# Patient Record
Sex: FEMALE | Race: WHITE
[De-identification: names, ages, dates, MRNs, and addresses within clinical notes are randomized per-mention and may not be internally consistent; named-entity substitution may affect disease eponyms.]

---

## 2017-03-10 ENCOUNTER — HOSPITAL ENCOUNTER (OUTPATIENT)
Dept: HOSPITAL 19 - WSOH | Age: 57
End: 2017-03-10
Attending: INTERNAL MEDICINE

## 2017-03-10 DIAGNOSIS — Z02.1: Primary | ICD-10-CM

## 2018-01-24 ENCOUNTER — HOSPITAL ENCOUNTER (OUTPATIENT)
Dept: HOSPITAL 19 - MC.RAD | Age: 58
End: 2018-01-24
Payer: COMMERCIAL

## 2018-01-24 DIAGNOSIS — Z12.31: Primary | ICD-10-CM

## 2018-01-24 DIAGNOSIS — Z53.8: ICD-10-CM

## 2019-09-01 ENCOUNTER — HOSPITAL ENCOUNTER (EMERGENCY)
Dept: HOSPITAL 19 - COL.ER | Age: 59
Discharge: HOME | End: 2019-09-01
Payer: COMMERCIAL

## 2019-09-01 VITALS — SYSTOLIC BLOOD PRESSURE: 122 MMHG | TEMPERATURE: 97.8 F | DIASTOLIC BLOOD PRESSURE: 61 MMHG

## 2019-09-01 VITALS — BODY MASS INDEX: 25.16 KG/M2 | HEIGHT: 67.01 IN | WEIGHT: 160.28 LBS

## 2019-09-01 VITALS — HEART RATE: 72 BPM

## 2019-09-01 DIAGNOSIS — K63.89: ICD-10-CM

## 2019-09-01 DIAGNOSIS — R10.32: ICD-10-CM

## 2019-09-01 DIAGNOSIS — E03.9: ICD-10-CM

## 2019-09-01 DIAGNOSIS — Z90.710: ICD-10-CM

## 2019-09-01 DIAGNOSIS — R11.10: Primary | ICD-10-CM

## 2019-09-01 LAB
ALBUMIN SERPL-MCNC: 4.6 GM/DL (ref 3.5–5)
ALP SERPL-CCNC: 78 U/L (ref 50–136)
ALT SERPL-CCNC: 14 U/L (ref 9–52)
ANION GAP SERPL CALC-SCNC: 9 MMOL/L (ref 7–16)
AST SERPL-CCNC: 38 U/L (ref 15–37)
BASOPHILS # BLD: 0 10*3/UL (ref 0–0.2)
BASOPHILS NFR BLD AUTO: 0.6 % (ref 0–2)
BILIRUB SERPL-MCNC: 0.2 MG/DL (ref 0–1)
BUN SERPL-MCNC: 17 MG/DL (ref 7–17)
CALCIUM SERPL-MCNC: 9.6 MG/DL (ref 8.4–10.2)
CHLORIDE SERPL-SCNC: 108 MMOL/L (ref 98–107)
CO2 SERPL-SCNC: 25 MMOL/L (ref 22–30)
CREAT SERPL-SCNC: 0.83 UMOL/L (ref 0.52–1.25)
CRP SERPL-MCNC: 0.6 MG/DL (ref 0–0.9)
EOSINOPHIL # BLD: 0.2 10*3/UL (ref 0–0.7)
EOSINOPHIL NFR BLD: 2.9 % (ref 0–4)
ERYTHROCYTE [DISTWIDTH] IN BLOOD BY AUTOMATED COUNT: 13.4 % (ref 11.5–14.5)
GLUCOSE SERPL-MCNC: 83 MG/DL (ref 74–106)
GRANULOCYTES # BLD AUTO: 56.9 % (ref 42.2–75.2)
HCT VFR BLD AUTO: 45.6 % (ref 37–47)
HGB BLD-MCNC: 14.8 G/DL (ref 12.5–16)
LIPASE SERPL-CCNC: 152 U/L (ref 23–300)
LYMPHOCYTES # BLD: 2 10*3/UL (ref 1.2–3.4)
LYMPHOCYTES NFR BLD: 31.4 % (ref 20–51)
MCH RBC QN AUTO: 28 PG (ref 27–31)
MCHC RBC AUTO-ENTMCNC: 33 G/DL (ref 33–37)
MCV RBC AUTO: 88 FL (ref 80–100)
MONOCYTES # BLD: 0.5 10*3/UL (ref 0.1–0.6)
MONOCYTES NFR BLD AUTO: 8 % (ref 1.7–9.3)
NEUTROPHILS # BLD: 3.6 10*3/UL (ref 1.4–6.5)
PH UR STRIP.AUTO: 6 [PH] (ref 5–8)
PLATELET # BLD AUTO: 260 K/MM3 (ref 130–400)
PMV BLD AUTO: 10.6 FL (ref 7.4–10.4)
POTASSIUM SERPL-SCNC: 4.1 MMOL/L (ref 3.4–5)
PROT SERPL-MCNC: 8.5 GM/DL (ref 6.4–8.2)
RBC # BLD AUTO: 5.21 M/MM3 (ref 4.1–5.3)
RBC # UR: (no result) /HPF
SODIUM SERPL-SCNC: 142 MMOL/L (ref 137–145)
SP GR UR STRIP.AUTO: 1.02 (ref 1–1.03)
SQUAMOUS # URNS: (no result) /HPF
URINE LEUKOCYTE ESTERASE: (no result)
URN COLLECT METHOD CLASS: (no result)

## 2019-09-04 ENCOUNTER — HOSPITAL ENCOUNTER (OUTPATIENT)
Dept: HOSPITAL 19 - SDCO | Age: 59
Discharge: HOME | End: 2019-09-04
Attending: INTERNAL MEDICINE
Payer: COMMERCIAL

## 2019-09-04 VITALS — HEART RATE: 58 BPM | DIASTOLIC BLOOD PRESSURE: 63 MMHG | TEMPERATURE: 98.4 F | SYSTOLIC BLOOD PRESSURE: 114 MMHG

## 2019-09-04 VITALS — DIASTOLIC BLOOD PRESSURE: 47 MMHG | HEART RATE: 62 BPM | SYSTOLIC BLOOD PRESSURE: 107 MMHG

## 2019-09-04 VITALS — DIASTOLIC BLOOD PRESSURE: 63 MMHG | SYSTOLIC BLOOD PRESSURE: 114 MMHG | HEART RATE: 59 BPM

## 2019-09-04 VITALS — HEIGHT: 67.01 IN | WEIGHT: 164.91 LBS | BODY MASS INDEX: 25.88 KG/M2

## 2019-09-04 VITALS — HEART RATE: 56 BPM | SYSTOLIC BLOOD PRESSURE: 105 MMHG | DIASTOLIC BLOOD PRESSURE: 60 MMHG

## 2019-09-04 VITALS — SYSTOLIC BLOOD PRESSURE: 114 MMHG | HEART RATE: 59 BPM | DIASTOLIC BLOOD PRESSURE: 63 MMHG

## 2019-09-04 DIAGNOSIS — R63.3: ICD-10-CM

## 2019-09-04 DIAGNOSIS — C18.7: Primary | ICD-10-CM

## 2019-09-04 DIAGNOSIS — K56.699: ICD-10-CM

## 2019-09-04 DIAGNOSIS — D12.8: ICD-10-CM

## 2019-09-04 NOTE — NUR
Patient returns to bay 4 per cart and transfers from cart to recliner.  IV
fluids infusing and denies pain or nausea.  Sipping on water.  Spouse in room.

## 2019-09-04 NOTE — NUR
Dr. Acosta here and talking with the patient and spouse re: need for follow
up with surgeon, CT scan, and oncology consult.  All questions answered.

## 2019-09-04 NOTE — NUR
Dismissal instructions signed and patient taken to the front door per
wheelchair and assisted into vehicle with instructions in hand.

## 2019-10-10 ENCOUNTER — HOSPITAL ENCOUNTER (EMERGENCY)
Dept: HOSPITAL 19 - COL.ER | Age: 59
Discharge: HOME | End: 2019-10-10
Payer: COMMERCIAL

## 2019-10-10 VITALS — DIASTOLIC BLOOD PRESSURE: 62 MMHG | HEART RATE: 65 BPM | SYSTOLIC BLOOD PRESSURE: 106 MMHG

## 2019-10-10 VITALS — TEMPERATURE: 98.7 F

## 2019-10-10 VITALS — BODY MASS INDEX: 25.16 KG/M2 | WEIGHT: 160.28 LBS | HEIGHT: 67.01 IN

## 2019-10-10 DIAGNOSIS — Z85.038: ICD-10-CM

## 2019-10-10 DIAGNOSIS — J18.9: Primary | ICD-10-CM

## 2019-10-10 LAB
ALBUMIN SERPL-MCNC: 4.7 GM/DL (ref 3.5–5)
ALP SERPL-CCNC: 66 U/L (ref 50–136)
ALT SERPL-CCNC: 19 U/L (ref 9–52)
ANION GAP SERPL CALC-SCNC: 10 MMOL/L (ref 7–16)
AST SERPL-CCNC: 44 U/L (ref 15–37)
BASOPHILS # BLD: 0 10*3/UL (ref 0–0.2)
BASOPHILS NFR BLD AUTO: 0.6 % (ref 0–2)
BILIRUB SERPL-MCNC: 0.4 MG/DL (ref 0–1)
BUN SERPL-MCNC: 19 MG/DL (ref 7–17)
CALCIUM SERPL-MCNC: 9.4 MG/DL (ref 8.4–10.2)
CHLORIDE SERPL-SCNC: 107 MMOL/L (ref 98–107)
CO2 SERPL-SCNC: 24 MMOL/L (ref 22–30)
CREAT SERPL-SCNC: 0.68 UMOL/L (ref 0.52–1.25)
EOSINOPHIL # BLD: 0.8 10*3/UL (ref 0–0.7)
EOSINOPHIL NFR BLD: 11.3 % (ref 0–4)
ERYTHROCYTE [DISTWIDTH] IN BLOOD BY AUTOMATED COUNT: 14.1 % (ref 11.5–14.5)
GLUCOSE SERPL-MCNC: 76 MG/DL (ref 74–106)
GRANULOCYTES # BLD AUTO: 56 % (ref 42.2–75.2)
HCT VFR BLD AUTO: 44.1 % (ref 37–47)
HGB BLD-MCNC: 14.2 G/DL (ref 12.5–16)
LYMPHOCYTES # BLD: 1.7 10*3/UL (ref 1.2–3.4)
LYMPHOCYTES NFR BLD: 24 % (ref 20–51)
MCH RBC QN AUTO: 29 PG (ref 27–31)
MCHC RBC AUTO-ENTMCNC: 32 G/DL (ref 33–37)
MCV RBC AUTO: 89 FL (ref 80–100)
MONOCYTES # BLD: 0.6 10*3/UL (ref 0.1–0.6)
MONOCYTES NFR BLD AUTO: 7.8 % (ref 1.7–9.3)
NEUTROPHILS # BLD: 4 10*3/UL (ref 1.4–6.5)
PLATELET # BLD AUTO: 177 K/MM3 (ref 130–400)
PMV BLD AUTO: 11.1 FL (ref 7.4–10.4)
POTASSIUM SERPL-SCNC: 4.2 MMOL/L (ref 3.4–5)
PROT SERPL-MCNC: 8.5 GM/DL (ref 6.4–8.2)
RBC # BLD AUTO: 4.95 M/MM3 (ref 4.1–5.3)
SODIUM SERPL-SCNC: 141 MMOL/L (ref 137–145)

## 2020-07-16 ENCOUNTER — HOSPITAL ENCOUNTER (OUTPATIENT)
Dept: HOSPITAL 19 - SDCO | Age: 60
Discharge: HOME | End: 2020-07-16
Attending: UROLOGY
Payer: COMMERCIAL

## 2020-07-16 VITALS — TEMPERATURE: 97.8 F | SYSTOLIC BLOOD PRESSURE: 114 MMHG | HEART RATE: 64 BPM | DIASTOLIC BLOOD PRESSURE: 64 MMHG

## 2020-07-16 VITALS — SYSTOLIC BLOOD PRESSURE: 121 MMHG | DIASTOLIC BLOOD PRESSURE: 75 MMHG | HEART RATE: 56 BPM

## 2020-07-16 VITALS — DIASTOLIC BLOOD PRESSURE: 80 MMHG | TEMPERATURE: 98.9 F | HEART RATE: 54 BPM | SYSTOLIC BLOOD PRESSURE: 132 MMHG

## 2020-07-16 VITALS — HEART RATE: 60 BPM | DIASTOLIC BLOOD PRESSURE: 78 MMHG | SYSTOLIC BLOOD PRESSURE: 120 MMHG

## 2020-07-16 VITALS — DIASTOLIC BLOOD PRESSURE: 78 MMHG | HEART RATE: 60 BPM | SYSTOLIC BLOOD PRESSURE: 135 MMHG

## 2020-07-16 VITALS — BODY MASS INDEX: 25.96 KG/M2 | WEIGHT: 171.3 LBS | HEIGHT: 68 IN

## 2020-07-16 DIAGNOSIS — Z92.21: ICD-10-CM

## 2020-07-16 DIAGNOSIS — Z90.49: ICD-10-CM

## 2020-07-16 DIAGNOSIS — E03.9: ICD-10-CM

## 2020-07-16 DIAGNOSIS — N13.1: Primary | ICD-10-CM

## 2020-07-16 DIAGNOSIS — Z79.899: ICD-10-CM

## 2020-07-16 DIAGNOSIS — Z88.6: ICD-10-CM

## 2020-07-16 DIAGNOSIS — Z85.038: ICD-10-CM

## 2020-07-16 DIAGNOSIS — Z90.710: ICD-10-CM

## 2020-07-16 PROCEDURE — C2617 STENT, NON-COR, TEM W/O DEL: HCPCS

## 2020-07-16 PROCEDURE — C1769 GUIDE WIRE: HCPCS

## 2020-07-16 NOTE — NUR
Patient returns to room 4 per cart from PACU accompanied by Carlie MURILLO and is
awake and alert.  Temp 97.5.  Denies pain or nausea.  IV fluids infusing and
site is free of redness.  Patient is sipping on water and grape juice.

## 2020-07-16 NOTE — NUR
Dismissal instructions given and IV discontinued.  Provided office numbers for
questions and concerns.  Patient dismissed to home driven by spouse and taken
to the front door per wheelchair and assisted into vehicle by this RN with in
structions in hand.

## 2020-09-15 ENCOUNTER — HOSPITAL ENCOUNTER (OUTPATIENT)
Dept: HOSPITAL 19 - MC.RAD | Age: 60
End: 2020-09-15
Attending: NURSE PRACTITIONER
Payer: COMMERCIAL

## 2020-09-15 DIAGNOSIS — Z12.31: Primary | ICD-10-CM

## 2020-09-16 ENCOUNTER — HOSPITAL ENCOUNTER (EMERGENCY)
Dept: HOSPITAL 19 - COL.ER | Age: 60
Discharge: HOME | End: 2020-09-16
Payer: COMMERCIAL

## 2020-09-16 VITALS — SYSTOLIC BLOOD PRESSURE: 114 MMHG | HEART RATE: 60 BPM | DIASTOLIC BLOOD PRESSURE: 71 MMHG

## 2020-09-16 VITALS — TEMPERATURE: 97.7 F

## 2020-09-16 VITALS — HEIGHT: 67.99 IN | BODY MASS INDEX: 25.53 KG/M2 | WEIGHT: 168.43 LBS

## 2020-09-16 DIAGNOSIS — C18.9: ICD-10-CM

## 2020-09-16 DIAGNOSIS — R10.31: Primary | ICD-10-CM

## 2020-09-16 DIAGNOSIS — R11.2: ICD-10-CM

## 2020-09-16 LAB
ALBUMIN SERPL-MCNC: 5.2 GM/DL (ref 3.5–5)
ALP SERPL-CCNC: 83 U/L (ref 50–136)
ALT SERPL-CCNC: 49 U/L (ref 4–34)
ANION GAP SERPL CALC-SCNC: 13 MMOL/L (ref 7–16)
AST SERPL-CCNC: 53 U/L (ref 15–37)
BASOPHILS # BLD: 0.1 10*3/UL (ref 0–0.2)
BASOPHILS NFR BLD AUTO: 0.9 % (ref 0–2)
BILIRUB SERPL-MCNC: 0.5 MG/DL (ref 0–1)
BUN SERPL-MCNC: 15 MG/DL (ref 7–17)
CALCIUM SERPL-MCNC: 9.8 MG/DL (ref 8.4–10.2)
CHLORIDE SERPL-SCNC: 104 MMOL/L (ref 98–107)
CO2 SERPL-SCNC: 24 MMOL/L (ref 22–30)
CREAT SERPL-SCNC: 0.93 UMOL/L (ref 0.52–1.25)
CRP SERPL-MCNC: < 0.5 MG/DL (ref 0–0.9)
EOSINOPHIL # BLD: 0.2 10*3/UL (ref 0–0.7)
EOSINOPHIL NFR BLD: 2.7 % (ref 0–4)
ERYTHROCYTE [DISTWIDTH] IN BLOOD BY AUTOMATED COUNT: 18.2 % (ref 11.5–14.5)
GLUCOSE SERPL-MCNC: 114 MG/DL (ref 74–106)
GRANULOCYTES # BLD AUTO: 58.3 % (ref 42.2–75.2)
HCT VFR BLD AUTO: 45.2 % (ref 37–47)
HGB BLD-MCNC: 15.2 G/DL (ref 12.5–16)
LIPASE SERPL-CCNC: 220 U/L (ref 23–300)
LYMPHOCYTES # BLD: 1.7 10*3/UL (ref 1.2–3.4)
LYMPHOCYTES NFR BLD: 30.3 % (ref 20–51)
MCH RBC QN AUTO: 31 PG (ref 27–31)
MCHC RBC AUTO-ENTMCNC: 34 G/DL (ref 33–37)
MCV RBC AUTO: 93 FL (ref 80–100)
MONOCYTES # BLD: 0.4 10*3/UL (ref 0.1–0.6)
MONOCYTES NFR BLD AUTO: 7.6 % (ref 1.7–9.3)
NEUTROPHILS # BLD: 3.3 10*3/UL (ref 1.4–6.5)
PH UR STRIP.AUTO: 5 [PH] (ref 5–8)
PLATELET # BLD AUTO: 184 K/MM3 (ref 130–400)
PMV BLD AUTO: 10.5 FL (ref 7.4–10.4)
POTASSIUM SERPL-SCNC: 3.6 MMOL/L (ref 3.4–5)
PROT SERPL-MCNC: 9.3 GM/DL (ref 6.4–8.2)
RBC # BLD AUTO: 4.87 M/MM3 (ref 4.1–5.3)
RBC # UR STRIP.AUTO: (no result) /UL
RBC # UR: >50 /HPF
SODIUM SERPL-SCNC: 141 MMOL/L (ref 137–145)
SP GR UR STRIP.AUTO: 1.01 (ref 1–1.03)
SQUAMOUS # URNS: (no result) /HPF
UA DIPSTICK PNL UR STRIP.AUTO: (no result)
URN COLLECT METHOD CLASS: (no result)

## 2020-09-18 ENCOUNTER — HOSPITAL ENCOUNTER (EMERGENCY)
Dept: HOSPITAL 19 - COL.ER | Age: 60
Discharge: HOME | End: 2020-09-18
Payer: COMMERCIAL

## 2020-09-18 VITALS — SYSTOLIC BLOOD PRESSURE: 120 MMHG | HEART RATE: 64 BPM | TEMPERATURE: 98 F | DIASTOLIC BLOOD PRESSURE: 68 MMHG

## 2020-09-18 VITALS — HEIGHT: 67.99 IN | BODY MASS INDEX: 25.53 KG/M2 | WEIGHT: 168.43 LBS

## 2020-09-18 DIAGNOSIS — C18.9: ICD-10-CM

## 2020-09-18 DIAGNOSIS — R10.31: Primary | ICD-10-CM

## 2020-09-18 DIAGNOSIS — Z96.0: ICD-10-CM

## 2020-09-18 DIAGNOSIS — Z90.711: ICD-10-CM

## 2020-09-18 LAB
ALBUMIN SERPL-MCNC: 4.8 GM/DL (ref 3.5–5)
ALP SERPL-CCNC: 65 U/L (ref 50–136)
ALT SERPL-CCNC: 41 U/L (ref 4–34)
ANION GAP SERPL CALC-SCNC: 10 MMOL/L (ref 7–16)
AST SERPL-CCNC: 59 U/L (ref 15–37)
BASOPHILS # BLD: 0 10*3/UL (ref 0–0.2)
BASOPHILS NFR BLD AUTO: 0.9 % (ref 0–2)
BILIRUB SERPL-MCNC: 0.6 MG/DL (ref 0–1)
BUN SERPL-MCNC: 16 MG/DL (ref 7–17)
CALCIUM SERPL-MCNC: 9.5 MG/DL (ref 8.4–10.2)
CHLORIDE SERPL-SCNC: 104 MMOL/L (ref 98–107)
CO2 SERPL-SCNC: 25 MMOL/L (ref 22–30)
CREAT SERPL-SCNC: 0.87 UMOL/L (ref 0.52–1.25)
CRP SERPL-MCNC: < 0.5 MG/DL (ref 0–0.9)
EOSINOPHIL # BLD: 0.2 10*3/UL (ref 0–0.7)
EOSINOPHIL NFR BLD: 4.3 % (ref 0–4)
ERYTHROCYTE [DISTWIDTH] IN BLOOD BY AUTOMATED COUNT: 17.9 % (ref 11.5–14.5)
GLUCOSE SERPL-MCNC: 91 MG/DL (ref 74–106)
GRANULOCYTES # BLD AUTO: 40.7 % (ref 42.2–75.2)
HCT VFR BLD AUTO: 42.9 % (ref 37–47)
HGB BLD-MCNC: 14.3 G/DL (ref 12.5–16)
LIPASE SERPL-CCNC: 133 U/L (ref 23–300)
LYMPHOCYTES # BLD: 1.9 10*3/UL (ref 1.2–3.4)
LYMPHOCYTES NFR BLD: 42.6 % (ref 20–51)
MCH RBC QN AUTO: 31 PG (ref 27–31)
MCHC RBC AUTO-ENTMCNC: 33 G/DL (ref 33–37)
MCV RBC AUTO: 94 FL (ref 80–100)
MONOCYTES # BLD: 0.5 10*3/UL (ref 0.1–0.6)
MONOCYTES NFR BLD AUTO: 11.3 % (ref 1.7–9.3)
NEUTROPHILS # BLD: 1.8 10*3/UL (ref 1.4–6.5)
PH UR STRIP.AUTO: 5 [PH] (ref 5–8)
PLATELET # BLD AUTO: 181 K/MM3 (ref 130–400)
PMV BLD AUTO: 10.7 FL (ref 7.4–10.4)
POTASSIUM SERPL-SCNC: 4.2 MMOL/L (ref 3.4–5)
PROT SERPL-MCNC: 8.7 GM/DL (ref 6.4–8.2)
RBC # BLD AUTO: 4.56 M/MM3 (ref 4.1–5.3)
RBC # UR STRIP.AUTO: (no result) /UL
RBC # UR: (no result) /HPF
SODIUM SERPL-SCNC: 138 MMOL/L (ref 137–145)
SP GR UR STRIP.AUTO: 1.01 (ref 1–1.03)
SQUAMOUS # URNS: (no result) /HPF
URN COLLECT METHOD CLASS: (no result)

## 2020-09-19 ENCOUNTER — HOSPITAL ENCOUNTER (OUTPATIENT)
Dept: HOSPITAL 19 - COL.ER | Age: 60
Setting detail: OBSERVATION
LOS: 1 days | Discharge: HOME | End: 2020-09-20
Attending: SURGERY | Admitting: SURGERY
Payer: COMMERCIAL

## 2020-09-19 VITALS — DIASTOLIC BLOOD PRESSURE: 72 MMHG | HEART RATE: 55 BPM | SYSTOLIC BLOOD PRESSURE: 130 MMHG

## 2020-09-19 VITALS — HEART RATE: 62 BPM | SYSTOLIC BLOOD PRESSURE: 130 MMHG | DIASTOLIC BLOOD PRESSURE: 75 MMHG

## 2020-09-19 VITALS — DIASTOLIC BLOOD PRESSURE: 66 MMHG | TEMPERATURE: 97.5 F | HEART RATE: 65 BPM | SYSTOLIC BLOOD PRESSURE: 124 MMHG

## 2020-09-19 VITALS — SYSTOLIC BLOOD PRESSURE: 132 MMHG | HEART RATE: 65 BPM | DIASTOLIC BLOOD PRESSURE: 70 MMHG

## 2020-09-19 VITALS — DIASTOLIC BLOOD PRESSURE: 62 MMHG | HEART RATE: 97 BPM | SYSTOLIC BLOOD PRESSURE: 122 MMHG

## 2020-09-19 VITALS — SYSTOLIC BLOOD PRESSURE: 137 MMHG | DIASTOLIC BLOOD PRESSURE: 69 MMHG | HEART RATE: 62 BPM

## 2020-09-19 VITALS — SYSTOLIC BLOOD PRESSURE: 113 MMHG | HEART RATE: 66 BPM | TEMPERATURE: 97.6 F | DIASTOLIC BLOOD PRESSURE: 67 MMHG

## 2020-09-19 VITALS — DIASTOLIC BLOOD PRESSURE: 72 MMHG | HEART RATE: 62 BPM | SYSTOLIC BLOOD PRESSURE: 131 MMHG

## 2020-09-19 VITALS — WEIGHT: 171.08 LBS | HEIGHT: 67.99 IN | BODY MASS INDEX: 25.93 KG/M2

## 2020-09-19 VITALS — TEMPERATURE: 97.6 F | SYSTOLIC BLOOD PRESSURE: 113 MMHG | DIASTOLIC BLOOD PRESSURE: 67 MMHG | HEART RATE: 65 BPM

## 2020-09-19 DIAGNOSIS — C78.6: ICD-10-CM

## 2020-09-19 DIAGNOSIS — Z90.710: ICD-10-CM

## 2020-09-19 DIAGNOSIS — C18.9: Primary | ICD-10-CM

## 2020-09-19 DIAGNOSIS — Z90.49: ICD-10-CM

## 2020-09-19 LAB
ALBUMIN SERPL-MCNC: 5.1 GM/DL (ref 3.5–5)
ALP SERPL-CCNC: 87 U/L (ref 50–136)
ALT SERPL-CCNC: 44 U/L (ref 4–34)
ANION GAP SERPL CALC-SCNC: 12 MMOL/L (ref 7–16)
AST SERPL-CCNC: 56 U/L (ref 15–37)
BASOPHILS # BLD: 0.1 10*3/UL (ref 0–0.2)
BASOPHILS NFR BLD AUTO: 0.8 % (ref 0–2)
BILIRUB SERPL-MCNC: 0.6 MG/DL (ref 0–1)
BUN SERPL-MCNC: 13 MG/DL (ref 7–17)
CALCIUM SERPL-MCNC: 9.6 MG/DL (ref 8.4–10.2)
CHLORIDE SERPL-SCNC: 106 MMOL/L (ref 98–107)
CO2 SERPL-SCNC: 22 MMOL/L (ref 22–30)
CREAT SERPL-SCNC: 0.87 UMOL/L (ref 0.52–1.25)
CRP SERPL-MCNC: < 0.5 MG/DL (ref 0–0.9)
EOSINOPHIL # BLD: 0.2 10*3/UL (ref 0–0.7)
EOSINOPHIL NFR BLD: 3.7 % (ref 0–4)
ERYTHROCYTE [DISTWIDTH] IN BLOOD BY AUTOMATED COUNT: 18 % (ref 11.5–14.5)
GLUCOSE SERPL-MCNC: 98 MG/DL (ref 74–106)
GRANULOCYTES # BLD AUTO: 46.6 % (ref 42.2–75.2)
HCT VFR BLD AUTO: 47.4 % (ref 37–47)
HGB BLD-MCNC: 16 G/DL (ref 12.5–16)
LIPASE SERPL-CCNC: 148 U/L (ref 23–300)
LYMPHOCYTES # BLD: 2.5 10*3/UL (ref 1.2–3.4)
LYMPHOCYTES NFR BLD: 39.8 % (ref 20–51)
MCH RBC QN AUTO: 32 PG (ref 27–31)
MCHC RBC AUTO-ENTMCNC: 34 G/DL (ref 33–37)
MCV RBC AUTO: 94 FL (ref 80–100)
MONOCYTES # BLD: 0.6 10*3/UL (ref 0.1–0.6)
MONOCYTES NFR BLD AUTO: 8.9 % (ref 1.7–9.3)
NEUTROPHILS # BLD: 2.9 10*3/UL (ref 1.4–6.5)
PLATELET # BLD AUTO: 204 K/MM3 (ref 130–400)
PMV BLD AUTO: 10.3 FL (ref 7.4–10.4)
POTASSIUM SERPL-SCNC: 3.9 MMOL/L (ref 3.4–5)
PROT SERPL-MCNC: 9.3 GM/DL (ref 6.4–8.2)
RBC # BLD AUTO: 5.07 M/MM3 (ref 4.1–5.3)
SODIUM SERPL-SCNC: 139 MMOL/L (ref 137–145)

## 2020-09-19 PROCEDURE — G0378 HOSPITAL OBSERVATION PER HR: HCPCS

## 2020-09-19 PROCEDURE — A4314 CATH W/DRAINAGE 2-WAY LATEX: HCPCS

## 2020-09-19 NOTE — NUR
Admission assessment complete. A&Ox3. VS stable. Denies nausea/shortness of
breath/pain. IV to left forearm with LR@100mls/hr. Lap sites x2 abdomen-edges
well approximated-no drainage noted. +Flatus. Plan of care discussed for this
shift to include pain control/IV fluids for hydration and calling for needs.
Verbalizes understanding. Call light in reach. Will monitor.

## 2020-09-19 NOTE — NUR
Patient has done well since up from surgery. Denies pain, states she feels
bloated after meal this afternoon. Up ambulating in halls with steady gait.
Denies further needs at this time. Will report off to night shift.

## 2020-09-20 VITALS — HEART RATE: 69 BPM | SYSTOLIC BLOOD PRESSURE: 112 MMHG | TEMPERATURE: 98.8 F | DIASTOLIC BLOOD PRESSURE: 77 MMHG

## 2020-09-20 VITALS — SYSTOLIC BLOOD PRESSURE: 121 MMHG | HEART RATE: 72 BPM | TEMPERATURE: 97.8 F | DIASTOLIC BLOOD PRESSURE: 63 MMHG

## 2020-09-20 VITALS — DIASTOLIC BLOOD PRESSURE: 65 MMHG | HEART RATE: 62 BPM | SYSTOLIC BLOOD PRESSURE: 121 MMHG | TEMPERATURE: 98.5 F

## 2020-09-20 LAB
PH UR STRIP.AUTO: 6 [PH] (ref 5–8)
RBC # UR STRIP.AUTO: (no result) /UL
RBC # UR: (no result) /HPF
SP GR UR STRIP.AUTO: 1.01 (ref 1–1.03)
URN COLLECT METHOD CLASS: (no result)

## 2020-09-20 NOTE — NUR
Patient alert and oriented, answers questions appropriately.  See assessment.
Abdomen soft, non tender, non distended.  Bowel sounds active x4 quads.
+Flatus. Lap sites with edges well approximated, no redness or drainage noted.
 No c/o at this time.

## 2020-09-20 NOTE — NUR
Discharge instructions reviewed with patient, verbalized understanding.
Discharged via wheelchair to auto/home with family at 0908.

## 2020-10-16 ENCOUNTER — HOSPITAL ENCOUNTER (OUTPATIENT)
Dept: HOSPITAL 19 - COL.RAD | Age: 60
End: 2020-10-16
Attending: INTERNAL MEDICINE
Payer: COMMERCIAL

## 2020-10-16 DIAGNOSIS — C18.7: Primary | ICD-10-CM

## 2020-10-16 DIAGNOSIS — M51.16: ICD-10-CM

## 2020-10-16 PROCEDURE — A9585 GADOBUTROL INJECTION: HCPCS

## 2020-10-19 ENCOUNTER — HOSPITAL ENCOUNTER (EMERGENCY)
Dept: HOSPITAL 19 - COL.ER | Age: 60
Discharge: HOME | End: 2020-10-19
Attending: EMERGENCY MEDICINE
Payer: COMMERCIAL

## 2020-10-19 ENCOUNTER — HOSPITAL ENCOUNTER (EMERGENCY)
Dept: HOSPITAL 6 - ED | Age: 60
Discharge: HOME | End: 2020-10-19
Payer: COMMERCIAL

## 2020-10-19 VITALS — BODY MASS INDEX: 25.06 KG/M2 | WEIGHT: 165.35 LBS | HEIGHT: 67.99 IN

## 2020-10-19 VITALS
DIASTOLIC BLOOD PRESSURE: 75 MMHG | DIASTOLIC BLOOD PRESSURE: 75 MMHG | SYSTOLIC BLOOD PRESSURE: 142 MMHG | SYSTOLIC BLOOD PRESSURE: 142 MMHG

## 2020-10-19 VITALS — WEIGHT: 165.35 LBS | HEIGHT: 67.99 IN | BODY MASS INDEX: 25.06 KG/M2

## 2020-10-19 VITALS — TEMPERATURE: 99.3 F

## 2020-10-19 VITALS — DIASTOLIC BLOOD PRESSURE: 80 MMHG | SYSTOLIC BLOOD PRESSURE: 146 MMHG | HEART RATE: 78 BPM

## 2020-10-19 DIAGNOSIS — C18.9: ICD-10-CM

## 2020-10-19 DIAGNOSIS — Z88.6: ICD-10-CM

## 2020-10-19 DIAGNOSIS — R10.9: ICD-10-CM

## 2020-10-19 DIAGNOSIS — R11.2: Primary | ICD-10-CM

## 2020-10-19 DIAGNOSIS — R11.10: Primary | ICD-10-CM

## 2020-10-19 LAB
ALBUMIN SERPL-MCNC: 5.1 GM/DL (ref 3.5–5)
ALP SERPL-CCNC: 76 U/L (ref 50–136)
ALT SERPL-CCNC: 30 U/L (ref 4–34)
ANION GAP SERPL CALC-SCNC: 11 MMOL/L (ref 7–16)
AST SERPL-CCNC: 38 U/L (ref 15–37)
BASOPHILS # BLD: 0 10*3/UL (ref 0–0.2)
BASOPHILS NFR BLD AUTO: 0.3 % (ref 0–2)
BILIRUB SERPL-MCNC: 0.6 MG/DL (ref 0–1)
BUN SERPL-MCNC: 10 MG/DL (ref 7–17)
CALCIUM SERPL-MCNC: 9.5 MG/DL (ref 8.4–10.2)
CALCIUM SERPL-MCNC: 9.6 MG/DL (ref 8.3–10.5)
CHLORIDE SERPL-SCNC: 107 MMOL/L (ref 98–107)
CO2 SERPL-SCNC: 19 MMOL/L (ref 22–29)
CO2 SERPL-SCNC: 22 MMOL/L (ref 22–30)
CREAT SERPL-SCNC: 0.78 UMOL/L (ref 0.52–1.25)
EOSINOPHIL # BLD: 0 10*3/UL (ref 0–0.7)
EOSINOPHIL NFR BLD: 0.2 % (ref 0–4)
ERYTHROCYTE [DISTWIDTH] IN BLOOD BY AUTOMATED COUNT: 15 % (ref 11.5–14.5)
ERYTHROCYTE [DISTWIDTH] IN BLOOD BY AUTOMATED COUNT: 15.1 % (ref 11.5–14.5)
GLUCOSE SERPL-MCNC: 104 MG/DL (ref 74–106)
GLUCOSE SERPL-MCNC: 157 MG/DL (ref 65–105)
GRANULOCYTES # BLD AUTO: 76.8 % (ref 42.2–75.2)
HCT VFR BLD AUTO: 45.3 % (ref 37–47)
HCT VFR BLD AUTO: 46.4 % (ref 37–47)
HGB BLD-MCNC: 15.3 G/DL (ref 12.5–16)
HGB BLD-MCNC: 15.7 G/DL (ref 12.5–16)
KETONES UR STRIP.AUTO-MCNC: (no result) MG/DL
LIPASE SERPL-CCNC: 87 U/L (ref 23–300)
LYMPHOCYTES # BLD: 1.5 10*3/UL (ref 1.2–3.4)
LYMPHOCYTES NFR BLD MANUAL: 13 % (ref 20–51)
LYMPHOCYTES NFR BLD: 14.7 % (ref 20–51)
MCH RBC QN AUTO: 31 PG (ref 27–31)
MCH RBC QN AUTO: 31 PG (ref 27–31)
MCHC RBC AUTO-ENTMCNC: 34 G/DL (ref 33–37)
MCHC RBC AUTO-ENTMCNC: 34 G/DL (ref 33–37)
MCV RBC AUTO: 92 FL (ref 78–100)
MCV RBC AUTO: 92 FL (ref 80–100)
MONOCYTES # BLD: 0.8 10*3/UL (ref 0.1–0.6)
MONOCYTES NFR BLD AUTO: 7.8 % (ref 1.7–9.3)
MONOCYTES NFR BLD: 4 % (ref 3–10)
NEUTROPHILS # BLD: 8.1 10*3/UL (ref 1.4–6.5)
NEUTS SEG NFR BLD MANUAL: 83 % (ref 42–75)
PH UR STRIP.AUTO: 6 [PH] (ref 5–8)
PLATELET # BLD AUTO: 286 K/MM3 (ref 130–400)
PLATELET # BLD AUTO: 292 K/MM3 (ref 130–400)
PMV BLD AUTO: 10.2 FL (ref 7.4–10.4)
PMV BLD AUTO: 10.9 FL (ref 7.4–10.4)
POTASSIUM SERPL-SCNC: 3.7 MMOL/L (ref 3.5–5.1)
POTASSIUM SERPL-SCNC: 3.9 MMOL/L (ref 3.4–5)
PROT SERPL-MCNC: 9.2 GM/DL (ref 6.4–8.2)
RBC # BLD AUTO: 4.94 M/MM3 (ref 4.1–5.3)
RBC # BLD AUTO: 5.03 M/MM3 (ref 4.1–5.3)
RBC # UR: (no result) /HPF
SODIUM SERPL-SCNC: 140 MMOL/L (ref 136–145)
SODIUM SERPL-SCNC: 140 MMOL/L (ref 137–145)
SP GR UR STRIP.AUTO: 1.04 (ref 1–1.03)
SQUAMOUS # URNS: (no result) /HPF
URN COLLECT METHOD CLASS: (no result)
WBC # BLD AUTO: 9.2 K/MM3 (ref 4.8–10.8)

## 2020-10-19 PROCEDURE — C9113 INJ PANTOPRAZOLE SODIUM, VIA: HCPCS

## 2020-10-27 ENCOUNTER — HOSPITAL ENCOUNTER (EMERGENCY)
Dept: HOSPITAL 19 - COL.ER | Age: 60
Discharge: HOME | End: 2020-10-27
Attending: EMERGENCY MEDICINE
Payer: COMMERCIAL

## 2020-10-27 VITALS — WEIGHT: 160.28 LBS | BODY MASS INDEX: 24.29 KG/M2 | HEIGHT: 67.99 IN

## 2020-10-27 VITALS — TEMPERATURE: 98 F

## 2020-10-27 VITALS — DIASTOLIC BLOOD PRESSURE: 68 MMHG | HEART RATE: 79 BPM | SYSTOLIC BLOOD PRESSURE: 121 MMHG

## 2020-10-27 DIAGNOSIS — G62.9: ICD-10-CM

## 2020-10-27 DIAGNOSIS — E86.0: ICD-10-CM

## 2020-10-27 DIAGNOSIS — N39.0: ICD-10-CM

## 2020-10-27 DIAGNOSIS — Z90.710: ICD-10-CM

## 2020-10-27 DIAGNOSIS — Z96.0: ICD-10-CM

## 2020-10-27 DIAGNOSIS — Z88.5: ICD-10-CM

## 2020-10-27 DIAGNOSIS — E03.9: ICD-10-CM

## 2020-10-27 DIAGNOSIS — C78.5: Primary | ICD-10-CM

## 2020-10-27 LAB
ALBUMIN SERPL-MCNC: 5.1 GM/DL (ref 3.5–5)
ALP SERPL-CCNC: 66 U/L (ref 50–136)
ALT SERPL-CCNC: 32 U/L (ref 4–34)
ANION GAP SERPL CALC-SCNC: 12 MMOL/L (ref 7–16)
AST SERPL-CCNC: 38 U/L (ref 15–37)
BILIRUB SERPL-MCNC: 0.5 MG/DL (ref 0–1)
BUN SERPL-MCNC: 12 MG/DL (ref 7–17)
CALCIUM SERPL-MCNC: 10.2 MG/DL (ref 8.4–10.2)
CHLORIDE SERPL-SCNC: 107 MMOL/L (ref 98–107)
CO2 SERPL-SCNC: 22 MMOL/L (ref 22–30)
CREAT SERPL-SCNC: 0.69 UMOL/L (ref 0.52–1.25)
ERYTHROCYTE [DISTWIDTH] IN BLOOD BY AUTOMATED COUNT: 14.3 % (ref 11.5–14.5)
GLUCOSE SERPL-MCNC: 135 MG/DL (ref 74–106)
HCT VFR BLD AUTO: 45.2 % (ref 37–47)
HGB BLD-MCNC: 15.1 G/DL (ref 12.5–16)
LIPASE SERPL-CCNC: 67 U/L (ref 23–300)
LYMPHOCYTES NFR BLD MANUAL: 6 % (ref 20–51)
MCH RBC QN AUTO: 32 PG (ref 27–31)
MCHC RBC AUTO-ENTMCNC: 33 G/DL (ref 33–37)
MCV RBC AUTO: 95 FL (ref 80–100)
MONOCYTES NFR BLD: 1 % (ref 1.7–9.3)
NEUTS BAND NFR BLD: 2 % (ref 0–10)
NEUTS SEG NFR BLD MANUAL: 91 % (ref 42–75.2)
PH UR STRIP.AUTO: 5 [PH] (ref 5–8)
PLATELET # BLD AUTO: 225 K/MM3 (ref 130–400)
PLATELET BLD QL SMEAR: NORMAL
PMV BLD AUTO: 10.7 FL (ref 7.4–10.4)
POTASSIUM SERPL-SCNC: 3.8 MMOL/L (ref 3.4–5)
PROT SERPL-MCNC: 9.3 GM/DL (ref 6.4–8.2)
RBC # BLD AUTO: 4.78 M/MM3 (ref 4.1–5.3)
RBC # UR: (no result) /HPF
SODIUM SERPL-SCNC: 141 MMOL/L (ref 137–145)
SP GR UR STRIP.AUTO: 1.01 (ref 1–1.03)
SQUAMOUS # URNS: (no result) /HPF
URN COLLECT METHOD CLASS: (no result)

## 2020-11-17 ENCOUNTER — HOSPITAL ENCOUNTER (EMERGENCY)
Dept: HOSPITAL 19 - COL.ER | Age: 60
Discharge: HOME | End: 2020-11-17
Payer: COMMERCIAL

## 2020-11-17 VITALS — DIASTOLIC BLOOD PRESSURE: 95 MMHG | TEMPERATURE: 97.2 F | SYSTOLIC BLOOD PRESSURE: 170 MMHG | HEART RATE: 83 BPM

## 2020-11-17 VITALS — HEIGHT: 67.99 IN | WEIGHT: 165.35 LBS | BODY MASS INDEX: 25.06 KG/M2

## 2020-11-17 DIAGNOSIS — Z90.710: ICD-10-CM

## 2020-11-17 DIAGNOSIS — Z88.6: ICD-10-CM

## 2020-11-17 DIAGNOSIS — R10.9: Primary | ICD-10-CM

## 2020-11-17 LAB
ALBUMIN SERPL-MCNC: 4.9 GM/DL (ref 3.5–5)
ALP SERPL-CCNC: 81 U/L (ref 50–136)
ALT SERPL-CCNC: 27 U/L (ref 4–34)
ANION GAP SERPL CALC-SCNC: 15 MMOL/L (ref 7–16)
AST SERPL-CCNC: 35 U/L (ref 15–37)
BASOPHILS # BLD: 0 10*3/UL (ref 0–0.2)
BASOPHILS NFR BLD AUTO: 0.1 % (ref 0–2)
BILIRUB SERPL-MCNC: 0.5 MG/DL (ref 0–1)
BUN SERPL-MCNC: 11 MG/DL (ref 7–17)
CALCIUM SERPL-MCNC: 9.9 MG/DL (ref 8.4–10.2)
CHLORIDE SERPL-SCNC: 105 MMOL/L (ref 98–107)
CO2 SERPL-SCNC: 18 MMOL/L (ref 22–30)
CREAT SERPL-SCNC: 0.75 UMOL/L (ref 0.52–1.25)
CRP SERPL-MCNC: < 0.5 MG/DL (ref 0–0.9)
EOSINOPHIL # BLD: 0 10*3/UL (ref 0–0.7)
EOSINOPHIL NFR BLD: 0 % (ref 0–4)
ERYTHROCYTE [DISTWIDTH] IN BLOOD BY AUTOMATED COUNT: 13.6 % (ref 11.5–14.5)
GLUCOSE SERPL-MCNC: 148 MG/DL (ref 74–106)
GRANULOCYTES # BLD AUTO: 84.1 % (ref 42.2–75.2)
HCT VFR BLD AUTO: 43.3 % (ref 37–47)
HGB BLD-MCNC: 14.4 G/DL (ref 12.5–16)
LIPASE SERPL-CCNC: 82 U/L (ref 23–300)
LYMPHOCYTES # BLD: 1.1 10*3/UL (ref 1.2–3.4)
LYMPHOCYTES NFR BLD: 10.4 % (ref 20–51)
MCH RBC QN AUTO: 31 PG (ref 27–31)
MCHC RBC AUTO-ENTMCNC: 33 G/DL (ref 33–37)
MCV RBC AUTO: 92 FL (ref 80–100)
MONOCYTES # BLD: 0.6 10*3/UL (ref 0.1–0.6)
MONOCYTES NFR BLD AUTO: 5.1 % (ref 1.7–9.3)
NEUTROPHILS # BLD: 9 10*3/UL (ref 1.4–6.5)
PH UR STRIP.AUTO: 7 [PH] (ref 5–8)
PLATELET # BLD AUTO: 297 K/MM3 (ref 130–400)
PMV BLD AUTO: 10.4 FL (ref 7.4–10.4)
POTASSIUM SERPL-SCNC: 3.3 MMOL/L (ref 3.4–5)
PROT SERPL-MCNC: 8.7 GM/DL (ref 6.4–8.2)
RBC # BLD AUTO: 4.71 M/MM3 (ref 4.1–5.3)
RBC # UR: (no result) /HPF
SODIUM SERPL-SCNC: 138 MMOL/L (ref 137–145)
SP GR UR STRIP.AUTO: 1.01 (ref 1–1.03)
SQUAMOUS # URNS: (no result) /HPF
URN COLLECT METHOD CLASS: (no result)

## 2020-11-28 ENCOUNTER — HOSPITAL ENCOUNTER (EMERGENCY)
Dept: HOSPITAL 19 - COL.ER | Age: 60
Discharge: HOME | End: 2020-11-28
Attending: EMERGENCY MEDICINE
Payer: COMMERCIAL

## 2020-11-28 VITALS — SYSTOLIC BLOOD PRESSURE: 126 MMHG | TEMPERATURE: 97.8 F | DIASTOLIC BLOOD PRESSURE: 82 MMHG

## 2020-11-28 VITALS — BODY MASS INDEX: 24.29 KG/M2 | HEIGHT: 67.99 IN | WEIGHT: 160.28 LBS

## 2020-11-28 VITALS — HEART RATE: 74 BPM

## 2020-11-28 DIAGNOSIS — Z88.6: ICD-10-CM

## 2020-11-28 DIAGNOSIS — E03.9: ICD-10-CM

## 2020-11-28 DIAGNOSIS — R11.2: Primary | ICD-10-CM

## 2020-11-28 DIAGNOSIS — Z85.038: ICD-10-CM

## 2020-11-28 DIAGNOSIS — F32.9: ICD-10-CM

## 2020-11-28 DIAGNOSIS — Z90.710: ICD-10-CM

## 2020-11-28 DIAGNOSIS — Z79.890: ICD-10-CM

## 2020-11-28 DIAGNOSIS — R10.9: ICD-10-CM

## 2020-11-28 DIAGNOSIS — G89.29: ICD-10-CM

## 2020-11-28 LAB
ALBUMIN SERPL-MCNC: 4.8 GM/DL (ref 3.5–5)
ALP SERPL-CCNC: 95 U/L (ref 50–136)
ALT SERPL-CCNC: 27 U/L (ref 4–34)
ANION GAP SERPL CALC-SCNC: 14 MMOL/L (ref 7–16)
AST SERPL-CCNC: 35 U/L (ref 15–37)
BASOPHILS # BLD: 0 10*3/UL (ref 0–0.2)
BASOPHILS NFR BLD AUTO: 0.5 % (ref 0–2)
BILIRUB SERPL-MCNC: 0.4 MG/DL (ref 0–1)
BUN SERPL-MCNC: 10 MG/DL (ref 7–17)
CALCIUM SERPL-MCNC: 9.9 MG/DL (ref 8.4–10.2)
CHLORIDE SERPL-SCNC: 104 MMOL/L (ref 98–107)
CO2 SERPL-SCNC: 21 MMOL/L (ref 22–30)
CREAT SERPL-SCNC: 0.84 UMOL/L (ref 0.52–1.25)
CRP SERPL-MCNC: 0.7 MG/DL (ref 0–0.9)
EOSINOPHIL # BLD: 0.3 10*3/UL (ref 0–0.7)
EOSINOPHIL NFR BLD: 3.9 % (ref 0–4)
ERYTHROCYTE [DISTWIDTH] IN BLOOD BY AUTOMATED COUNT: 13.6 % (ref 11.5–14.5)
GLUCOSE SERPL-MCNC: 123 MG/DL (ref 74–106)
GRANULOCYTES # BLD AUTO: 62.6 % (ref 42.2–75.2)
HCT VFR BLD AUTO: 44.9 % (ref 37–47)
HGB BLD-MCNC: 14.9 G/DL (ref 12.5–16)
LYMPHOCYTES # BLD: 2.2 10*3/UL (ref 1.2–3.4)
LYMPHOCYTES NFR BLD: 25.3 % (ref 20–51)
MCH RBC QN AUTO: 31 PG (ref 27–31)
MCHC RBC AUTO-ENTMCNC: 33 G/DL (ref 33–37)
MCV RBC AUTO: 93 FL (ref 80–100)
MONOCYTES # BLD: 0.6 10*3/UL (ref 0.1–0.6)
MONOCYTES NFR BLD AUTO: 7.4 % (ref 1.7–9.3)
NEUTROPHILS # BLD: 5.4 10*3/UL (ref 1.4–6.5)
PH UR STRIP.AUTO: 5 [PH] (ref 5–8)
PLATELET # BLD AUTO: 349 K/MM3 (ref 130–400)
PMV BLD AUTO: 10.1 FL (ref 7.4–10.4)
POTASSIUM SERPL-SCNC: 3.4 MMOL/L (ref 3.4–5)
PROT SERPL-MCNC: 8.5 GM/DL (ref 6.4–8.2)
RBC # BLD AUTO: 4.81 M/MM3 (ref 4.1–5.3)
RBC # UR: (no result) /HPF
SODIUM SERPL-SCNC: 139 MMOL/L (ref 137–145)
SP GR UR STRIP.AUTO: 1.01 (ref 1–1.03)
SQUAMOUS # URNS: (no result) /HPF
UA DIPSTICK PNL UR STRIP.AUTO: (no result)
URN COLLECT METHOD CLASS: (no result)

## 2020-12-23 ENCOUNTER — HOSPITAL ENCOUNTER (EMERGENCY)
Dept: HOSPITAL 19 - COL.ER | Age: 60
Discharge: HOME | End: 2020-12-23
Attending: EMERGENCY MEDICINE
Payer: COMMERCIAL

## 2020-12-23 VITALS — WEIGHT: 160.28 LBS | BODY MASS INDEX: 24.29 KG/M2 | HEIGHT: 67.99 IN

## 2020-12-23 VITALS — DIASTOLIC BLOOD PRESSURE: 71 MMHG | SYSTOLIC BLOOD PRESSURE: 121 MMHG | HEART RATE: 84 BPM

## 2020-12-23 VITALS — TEMPERATURE: 98.3 F

## 2020-12-23 DIAGNOSIS — R00.0: ICD-10-CM

## 2020-12-23 DIAGNOSIS — R19.00: Primary | ICD-10-CM

## 2020-12-23 DIAGNOSIS — Z88.1: ICD-10-CM

## 2020-12-23 DIAGNOSIS — R11.10: ICD-10-CM

## 2020-12-23 DIAGNOSIS — Z90.710: ICD-10-CM

## 2020-12-23 DIAGNOSIS — Z88.5: ICD-10-CM

## 2020-12-23 DIAGNOSIS — R10.84: ICD-10-CM

## 2020-12-23 LAB
ALBUMIN SERPL-MCNC: 5.1 GM/DL (ref 3.5–5)
ALP SERPL-CCNC: 97 U/L (ref 50–136)
ALT SERPL-CCNC: 30 U/L (ref 4–34)
ANION GAP SERPL CALC-SCNC: 14 MMOL/L (ref 7–16)
AST SERPL-CCNC: 38 U/L (ref 15–37)
BASOPHILS # BLD: 0 10*3/UL (ref 0–0.2)
BASOPHILS NFR BLD AUTO: 0.6 % (ref 0–2)
BILIRUB SERPL-MCNC: 0.5 MG/DL (ref 0–1)
BUN SERPL-MCNC: 14 MG/DL (ref 7–17)
CALCIUM SERPL-MCNC: 10 MG/DL (ref 8.4–10.2)
CHLORIDE SERPL-SCNC: 104 MMOL/L (ref 98–107)
CO2 SERPL-SCNC: 23 MMOL/L (ref 22–30)
CREAT SERPL-SCNC: 0.84 UMOL/L (ref 0.52–1.25)
CRP SERPL-MCNC: 0.8 MG/DL (ref 0–0.9)
EOSINOPHIL # BLD: 0 10*3/UL (ref 0–0.7)
EOSINOPHIL NFR BLD: 0.2 % (ref 0–4)
ERYTHROCYTE [DISTWIDTH] IN BLOOD BY AUTOMATED COUNT: 13.6 % (ref 11.5–14.5)
GLUCOSE SERPL-MCNC: 147 MG/DL (ref 74–106)
GRANULOCYTES # BLD AUTO: 86.7 % (ref 42.2–75.2)
HCT VFR BLD AUTO: 45.4 % (ref 37–47)
HGB BLD-MCNC: 14.8 G/DL (ref 12.5–16)
LIPASE SERPL-CCNC: 171 U/L (ref 23–300)
LYMPHOCYTES # BLD: 0.6 10*3/UL (ref 1.2–3.4)
LYMPHOCYTES NFR BLD: 8.9 % (ref 20–51)
MCH RBC QN AUTO: 30 PG (ref 27–31)
MCHC RBC AUTO-ENTMCNC: 33 G/DL (ref 33–37)
MCV RBC AUTO: 91 FL (ref 80–100)
MONOCYTES # BLD: 0.2 10*3/UL (ref 0.1–0.6)
MONOCYTES NFR BLD AUTO: 3.3 % (ref 1.7–9.3)
NEUTROPHILS # BLD: 5.7 10*3/UL (ref 1.4–6.5)
PH UR STRIP.AUTO: 6 [PH] (ref 5–8)
PLATELET # BLD AUTO: 353 K/MM3 (ref 130–400)
PMV BLD AUTO: 9.8 FL (ref 7.4–10.4)
POTASSIUM SERPL-SCNC: 3.8 MMOL/L (ref 3.4–5)
PROT SERPL-MCNC: 9.2 GM/DL (ref 6.4–8.2)
RBC # BLD AUTO: 4.98 M/MM3 (ref 4.1–5.3)
RBC # UR STRIP.AUTO: (no result) /UL
RBC # UR: (no result) /HPF
SODIUM SERPL-SCNC: 140 MMOL/L (ref 137–145)
SP GR UR STRIP.AUTO: 1.04 (ref 1–1.03)
SQUAMOUS # URNS: (no result) /HPF
URN COLLECT METHOD CLASS: (no result)

## 2020-12-24 ENCOUNTER — HOSPITAL ENCOUNTER (INPATIENT)
Dept: HOSPITAL 19 - COL.ER | Age: 60
LOS: 7 days | Discharge: HOME HEALTH | DRG: 844 | End: 2020-12-31
Attending: INTERNAL MEDICINE | Admitting: INTERNAL MEDICINE
Payer: COMMERCIAL

## 2020-12-24 VITALS — TEMPERATURE: 98.7 F | HEART RATE: 66 BPM | SYSTOLIC BLOOD PRESSURE: 130 MMHG | DIASTOLIC BLOOD PRESSURE: 77 MMHG

## 2020-12-24 VITALS — SYSTOLIC BLOOD PRESSURE: 134 MMHG | HEART RATE: 66 BPM | TEMPERATURE: 98.7 F | DIASTOLIC BLOOD PRESSURE: 72 MMHG

## 2020-12-24 VITALS — HEIGHT: 67.99 IN | BODY MASS INDEX: 24.29 KG/M2 | WEIGHT: 160.28 LBS

## 2020-12-24 DIAGNOSIS — C79.89: Primary | ICD-10-CM

## 2020-12-24 DIAGNOSIS — M79.2: ICD-10-CM

## 2020-12-24 DIAGNOSIS — F32.9: ICD-10-CM

## 2020-12-24 DIAGNOSIS — R11.2: ICD-10-CM

## 2020-12-24 DIAGNOSIS — R19.09: ICD-10-CM

## 2020-12-24 DIAGNOSIS — E87.6: ICD-10-CM

## 2020-12-24 DIAGNOSIS — Z90.710: ICD-10-CM

## 2020-12-24 DIAGNOSIS — Z88.5: ICD-10-CM

## 2020-12-24 DIAGNOSIS — Z90.49: ICD-10-CM

## 2020-12-24 DIAGNOSIS — N13.30: ICD-10-CM

## 2020-12-24 DIAGNOSIS — R07.89: ICD-10-CM

## 2020-12-24 DIAGNOSIS — C18.9: ICD-10-CM

## 2020-12-24 DIAGNOSIS — Z88.1: ICD-10-CM

## 2020-12-24 DIAGNOSIS — Z79.891: ICD-10-CM

## 2020-12-24 DIAGNOSIS — E03.9: ICD-10-CM

## 2020-12-24 LAB
ALBUMIN SERPL-MCNC: 4.5 GM/DL (ref 3.5–5)
ALP SERPL-CCNC: 77 U/L (ref 50–136)
ALT SERPL-CCNC: 23 U/L (ref 4–34)
ANION GAP SERPL CALC-SCNC: 14 MMOL/L (ref 7–16)
APTT PPP: 33.4 SECONDS (ref 26–37)
AST SERPL-CCNC: 34 U/L (ref 15–37)
BASOPHILS # BLD: 0 10*3/UL (ref 0–0.2)
BASOPHILS NFR BLD AUTO: 0.4 % (ref 0–2)
BILIRUB SERPL-MCNC: 0.4 MG/DL (ref 0–1)
BUN SERPL-MCNC: 12 MG/DL (ref 7–17)
CALCIUM SERPL-MCNC: 9.3 MG/DL (ref 8.4–10.2)
CHLORIDE SERPL-SCNC: 105 MMOL/L (ref 98–107)
CO2 SERPL-SCNC: 23 MMOL/L (ref 22–30)
CREAT SERPL-SCNC: 0.87 UMOL/L (ref 0.52–1.25)
EOSINOPHIL # BLD: 0.1 10*3/UL (ref 0–0.7)
EOSINOPHIL NFR BLD: 1.4 % (ref 0–4)
ERYTHROCYTE [DISTWIDTH] IN BLOOD BY AUTOMATED COUNT: 13.8 % (ref 11.5–14.5)
GLUCOSE SERPL-MCNC: 124 MG/DL (ref 74–106)
GRANULOCYTES # BLD AUTO: 77.8 % (ref 42.2–75.2)
HCT VFR BLD AUTO: 42.7 % (ref 37–47)
HGB BLD-MCNC: 13.9 G/DL (ref 12.5–16)
INR BLD: 1.1 (ref 0.8–3)
LIPASE SERPL-CCNC: 127 U/L (ref 23–300)
LYMPHOCYTES # BLD: 1.3 10*3/UL (ref 1.2–3.4)
LYMPHOCYTES NFR BLD: 12.3 % (ref 20–51)
MCH RBC QN AUTO: 30 PG (ref 27–31)
MCHC RBC AUTO-ENTMCNC: 33 G/DL (ref 33–37)
MCV RBC AUTO: 91 FL (ref 80–100)
MONOCYTES # BLD: 0.8 10*3/UL (ref 0.1–0.6)
MONOCYTES NFR BLD AUTO: 7.9 % (ref 1.7–9.3)
NEUTROPHILS # BLD: 8.1 10*3/UL (ref 1.4–6.5)
PH UR STRIP.AUTO: 6 [PH] (ref 5–8)
PLATELET # BLD AUTO: 347 K/MM3 (ref 130–400)
PMV BLD AUTO: 9.9 FL (ref 7.4–10.4)
POTASSIUM SERPL-SCNC: 3.1 MMOL/L (ref 3.4–5)
PROT SERPL-MCNC: 8.4 GM/DL (ref 6.4–8.2)
PROTHROMBIN TIME: 12.8 SECONDS (ref 9.7–12.8)
RBC # BLD AUTO: 4.67 M/MM3 (ref 4.1–5.3)
RBC # UR STRIP.AUTO: (no result) /UL
RBC # UR: (no result) /HPF
SODIUM SERPL-SCNC: 142 MMOL/L (ref 137–145)
SP GR UR STRIP.AUTO: 1.03 (ref 1–1.03)
SQUAMOUS # URNS: (no result) /HPF
TROPONIN I SERPL-MCNC: < 0.012 NG/ML (ref 0–0.04)
URN COLLECT METHOD CLASS: (no result)
WBC # UR: (no result) /HPF

## 2020-12-24 PROCEDURE — C9113 INJ PANTOPRAZOLE SODIUM, VIA: HCPCS

## 2020-12-24 PROCEDURE — C1751 CATH, INF, PER/CENT/MIDLINE: HCPCS

## 2020-12-24 PROCEDURE — G0378 HOSPITAL OBSERVATION PER HR: HCPCS

## 2020-12-24 PROCEDURE — A9284 NON-ELECTRONIC SPIROMETER: HCPCS

## 2020-12-24 NOTE — NUR
SW responded to consult. The patient has IV colon cancer and may be interested
in changing plan of care to palliative care. Her pain is not being controlled
at home. NATHAN met with the patient. The patient lives in [place] with her
, Boogie (ph#[phone]). She reports great support from him and
the rest of her family. She states that her oncologist in [place] is Dr. Roberts and she has another oncologist, Dr. Sanches, in [place]. SW
addressed goals of care. The patient states that her oncologists have
discussed maybe being able to surgically remove a tumor that she has, that
sits on her bowels. She went to [facility] for a second opinion, but is unsure
if they have decided that they could do this. She states that if the tumor was
able to be removed, then she would want to pursue with this. If the tumor
cannot not be removed, then she would be interested in hospice care possibly
in the home vs hospice house. The patient attempted to contact Dr. Sanches's
office. Her volume was on speaker phone. No one answered at Dr. Sanches's
office. She left a voicemail. The ED doctor then entered her room. SW updated
him on the above information. The patient reports that she would like to stay
here for palliative care for couple days and then return home and hopefully
get a hold of her oncologists, to get the surgery. The patient was informed
that we could not do this. The patient is stable. Plan is to try and have the
patient eat and get nausea/pain managed with meds. NATHAN encouraged the patient
to talk to her oncologists and doctors. NATHAN asked the patient if she has spoken
to her  and family about considering hospice. The patient reports that
she has not. NATHAN encouraged the patient to do this and provided her with a list
of the different hospice agencies that serve the [facility] area. NATHAN
also discussed home health in the meantime, while she is waiting to hear from
doctors. The patient reports that she is not interested in home health at this
time. She had no other questions for NATHAN at this time. NATHAN updated the ED doctor
and RN.

## 2020-12-25 VITALS — DIASTOLIC BLOOD PRESSURE: 73 MMHG | TEMPERATURE: 98.9 F | SYSTOLIC BLOOD PRESSURE: 124 MMHG | HEART RATE: 62 BPM

## 2020-12-25 VITALS — DIASTOLIC BLOOD PRESSURE: 57 MMHG | HEART RATE: 69 BPM | TEMPERATURE: 98.8 F | SYSTOLIC BLOOD PRESSURE: 126 MMHG

## 2020-12-25 VITALS — DIASTOLIC BLOOD PRESSURE: 58 MMHG | SYSTOLIC BLOOD PRESSURE: 114 MMHG | HEART RATE: 72 BPM | TEMPERATURE: 99.3 F

## 2020-12-25 VITALS — DIASTOLIC BLOOD PRESSURE: 71 MMHG | HEART RATE: 63 BPM | SYSTOLIC BLOOD PRESSURE: 123 MMHG | TEMPERATURE: 98.3 F

## 2020-12-25 VITALS — SYSTOLIC BLOOD PRESSURE: 112 MMHG | TEMPERATURE: 98.9 F | DIASTOLIC BLOOD PRESSURE: 66 MMHG | HEART RATE: 62 BPM

## 2020-12-25 VITALS — TEMPERATURE: 98.5 F | SYSTOLIC BLOOD PRESSURE: 130 MMHG | HEART RATE: 65 BPM | DIASTOLIC BLOOD PRESSURE: 67 MMHG

## 2020-12-25 LAB
ANION GAP SERPL CALC-SCNC: 6 MMOL/L (ref 7–16)
BASOPHILS # BLD: 0.1 10*3/UL (ref 0–0.2)
BASOPHILS NFR BLD AUTO: 0.9 % (ref 0–2)
BUN SERPL-MCNC: 8 MG/DL (ref 7–17)
CALCIUM SERPL-MCNC: 8.4 MG/DL (ref 8.4–10.2)
CHLORIDE SERPL-SCNC: 109 MMOL/L (ref 98–107)
CO2 SERPL-SCNC: 22 MMOL/L (ref 22–30)
CREAT SERPL-SCNC: 0.83 UMOL/L (ref 0.52–1.25)
EOSINOPHIL # BLD: 0.3 10*3/UL (ref 0–0.7)
EOSINOPHIL NFR BLD: 5 % (ref 0–4)
ERYTHROCYTE [DISTWIDTH] IN BLOOD BY AUTOMATED COUNT: 14 % (ref 11.5–14.5)
GLUCOSE SERPL-MCNC: 83 MG/DL (ref 74–106)
GRANULOCYTES # BLD AUTO: 50.1 % (ref 42.2–75.2)
HCT VFR BLD AUTO: 35.7 % (ref 37–47)
HGB BLD-MCNC: 11.7 G/DL (ref 12.5–16)
LYMPHOCYTES # BLD: 2 10*3/UL (ref 1.2–3.4)
LYMPHOCYTES NFR BLD: 34 % (ref 20–51)
MAGNESIUM SERPL-MCNC: 2.1 MG/DL (ref 1.6–2.3)
MCH RBC QN AUTO: 31 PG (ref 27–31)
MCHC RBC AUTO-ENTMCNC: 33 G/DL (ref 33–37)
MCV RBC AUTO: 94 FL (ref 80–100)
MONOCYTES # BLD: 0.6 10*3/UL (ref 0.1–0.6)
MONOCYTES NFR BLD AUTO: 9.8 % (ref 1.7–9.3)
NEUTROPHILS # BLD: 2.9 10*3/UL (ref 1.4–6.5)
PLATELET # BLD AUTO: 303 K/MM3 (ref 130–400)
PMV BLD AUTO: 10.8 FL (ref 7.4–10.4)
POTASSIUM SERPL-SCNC: 3.6 MMOL/L (ref 3.4–5)
RBC # BLD AUTO: 3.8 M/MM3 (ref 4.1–5.3)
SODIUM SERPL-SCNC: 137 MMOL/L (ref 137–145)

## 2020-12-25 NOTE — NUR
Patient done eating dinner, ate about 75%. Requests some Zofran. Administer
Zofran as prescribed. Patient says that she will need to get her Estrace and
Lyrica ordered. Explained that we will need to speak with the provider to get
these meds ordered.

## 2020-12-25 NOTE — NUR
Patient having some nausea and requests Zofran. Zofran administered as
prescribed. Dr. Hooks in room talking with patient.

## 2020-12-25 NOTE — NUR
Patient lying in bed with eyes closed. Opens eyes when enter room. Alert and
oriented x4. Minimal pain in abd at this time, describes as sore, patient says
that this is due to her muscles sore in abd from vomiting. Patient says that
her nausea is a little better. Patient would like to get back on her regimen
of phenergan suppositories as she was on at home as that was working well for
her. Explain that we can discuss this with the provider today. Patient denies
additional needs at this time.

## 2020-12-25 NOTE — NUR
Sitting up in bed. Says that the Zofran helped with her nausea. COntinues to
work on jello that she got at lunch. Patient denies any additional needs at
this time.

## 2020-12-25 NOTE — NUR
Spoke with patient regarding getting records from [facility]. Patient says that
she does not think that it is necessary to try to get records from them as
they do not even have a plan yet on what they are going to do. Patient says
that she is just here to try to get this nausea back under control and she
plans to contact [facility] to let them know about her stay here and see what
the plan is.Per the patient she thinks they will end up having to perform some
type of surgery to try to remove tumor but she is not certain that this will
happen. Explain that the providers here are just trying to get on the same
plan as to what [facility] would suggest doing and if she may need to get
transferred to [facility] the provider here will need to talk with the provider
there. Patient says that she does not think the transfer will happen at this
time. Is more concerned about getting nausea under control so that she can go
home and then follow up with [facility].

## 2020-12-25 NOTE — NUR
Report received, assumed care for night shift. Assessment complete.
A&Ox3-drowsy. Vs stable. Denies pain/nausea/shortness of breath. Voiding
without difficulty. IV to right forearm infusing D5NS with 20meqK @75ml/hr.
Infusing without difficulty. Tolerating diet. Plan of care discussed for this
shift to [facility] meds/calling for questions/concerns. Call light in
reach/will continue to monitor.

## 2020-12-25 NOTE — NUR
Patient spouse calls to get update on patient. Spouse does not have four digit
code to get patient information. Ask patient if I can speak with spouse
regarding her health care and the patient says yes. Provide the patient and
spouse with four digit code. Patient spouse then goes on to say that he did
not want to interrupt the patient and would have just called her cell phone if
this was going to happen. Apologize to the patient and spouse for the
inconvenience and explain the policy we have in place with the four digit code
to get patient information. Spouse says that he has not had to do this with
getting information from the ER. Provide update to spouse. Spouse asks when
surgery will be. Explain that nothing was relayed to me about having a surgery
and ask for clarification if a surgery is to be done. Spouse says that they
are going to have surgery at [facility]. Explain that we will have to try to
get records from [facility] today and if not today we will have to try
tomorrow. Spouse then says okay and tells the patient he will call her on her
cell phone and then hangs the phone up. Apologize to the patient for any
inconvenience and again explain out policy with the four digit code to give
out her information. Patient says that she understands.

## 2020-12-25 NOTE — NUR
Lying in bed with eyes open. Patient says that she is doing okay at this time.
Denies pain or nausea when asked. No further needs at this time.

## 2020-12-26 VITALS — DIASTOLIC BLOOD PRESSURE: 45 MMHG | TEMPERATURE: 98.2 F | HEART RATE: 60 BPM | SYSTOLIC BLOOD PRESSURE: 117 MMHG

## 2020-12-26 VITALS — TEMPERATURE: 99.4 F | DIASTOLIC BLOOD PRESSURE: 82 MMHG | HEART RATE: 75 BPM | SYSTOLIC BLOOD PRESSURE: 168 MMHG

## 2020-12-26 VITALS — SYSTOLIC BLOOD PRESSURE: 138 MMHG | HEART RATE: 64 BPM | TEMPERATURE: 99.1 F | DIASTOLIC BLOOD PRESSURE: 71 MMHG

## 2020-12-26 VITALS — DIASTOLIC BLOOD PRESSURE: 72 MMHG | HEART RATE: 75 BPM | TEMPERATURE: 97.8 F | SYSTOLIC BLOOD PRESSURE: 113 MMHG

## 2020-12-26 VITALS — DIASTOLIC BLOOD PRESSURE: 84 MMHG | TEMPERATURE: 98.7 F | SYSTOLIC BLOOD PRESSURE: 171 MMHG | HEART RATE: 70 BPM

## 2020-12-26 VITALS — SYSTOLIC BLOOD PRESSURE: 112 MMHG | DIASTOLIC BLOOD PRESSURE: 55 MMHG | HEART RATE: 58 BPM | TEMPERATURE: 98 F

## 2020-12-26 VITALS — SYSTOLIC BLOOD PRESSURE: 154 MMHG | DIASTOLIC BLOOD PRESSURE: 77 MMHG

## 2020-12-26 LAB
ANION GAP SERPL CALC-SCNC: 7 MMOL/L (ref 7–16)
BASOPHILS # BLD: 0.1 10*3/UL (ref 0–0.2)
BASOPHILS NFR BLD AUTO: 1.1 % (ref 0–2)
BUN SERPL-MCNC: 9 MG/DL (ref 7–17)
CALCIUM SERPL-MCNC: 8.5 MG/DL (ref 8.4–10.2)
CHLORIDE SERPL-SCNC: 108 MMOL/L (ref 98–107)
CO2 SERPL-SCNC: 25 MMOL/L (ref 22–30)
CREAT SERPL-SCNC: 0.95 UMOL/L (ref 0.52–1.25)
EOSINOPHIL # BLD: 0.4 10*3/UL (ref 0–0.7)
EOSINOPHIL NFR BLD: 7.7 % (ref 0–4)
ERYTHROCYTE [DISTWIDTH] IN BLOOD BY AUTOMATED COUNT: 13.9 % (ref 11.5–14.5)
GLUCOSE SERPL-MCNC: 93 MG/DL (ref 74–106)
GRANULOCYTES # BLD AUTO: 38.5 % (ref 42.2–75.2)
HCT VFR BLD AUTO: 35.4 % (ref 37–47)
HGB BLD-MCNC: 11.7 G/DL (ref 12.5–16)
LYMPHOCYTES # BLD: 1.9 10*3/UL (ref 1.2–3.4)
LYMPHOCYTES NFR BLD: 40.3 % (ref 20–51)
MAGNESIUM SERPL-MCNC: 2.1 MG/DL (ref 1.6–2.3)
MCH RBC QN AUTO: 31 PG (ref 27–31)
MCHC RBC AUTO-ENTMCNC: 33 G/DL (ref 33–37)
MCV RBC AUTO: 93 FL (ref 80–100)
MONOCYTES # BLD: 0.6 10*3/UL (ref 0.1–0.6)
MONOCYTES NFR BLD AUTO: 12.2 % (ref 1.7–9.3)
NEUTROPHILS # BLD: 1.8 10*3/UL (ref 1.4–6.5)
PLATELET # BLD AUTO: 307 K/MM3 (ref 130–400)
PMV BLD AUTO: 10.5 FL (ref 7.4–10.4)
POTASSIUM SERPL-SCNC: 3.8 MMOL/L (ref 3.4–5)
RBC # BLD AUTO: 3.81 M/MM3 (ref 4.1–5.3)
SODIUM SERPL-SCNC: 140 MMOL/L (ref 137–145)

## 2020-12-26 NOTE — NUR
PT HAS EMESIS IN BASIN. ASKING FOR NAUSEA MEDS. NOTED PROTONIX TABLET IN
EMESIS BASIN, HAD NOT DISSOLVED AND WAS ADMINISTERED AT 1747. MEDICATED WITH
ZOFRAN 4MG IV AT THIS TIME.

## 2020-12-26 NOTE — NUR
Called with c/o nausea-requesting zofran-given at this time per dr order. Has
tolerated PO this shift. Denied need for pain medications. D5NS with 20meg K
infusing at 75mls/hr to right forearm IV. Call light in reach. Will monitor.

## 2020-12-26 NOTE — NUR
Roz, house supervisor, tells this nurse that the patient has called out
crying a couple times and that her  has called the nurses station
explaining that they need someone to give her pain management. Explain to
Roz that we have provided Dilaudid, Zofran, Fentanyl patch, and Ativan and
that Rocio and Dr. Abreu are aware of the patient situation. Will update Dr. Abreu and have her see patient.

## 2020-12-26 NOTE — NUR
Phenergan started. Patient was up to bathroom to urinate and returned to bed.
Offered to patient to  call spouse to see what questions he has and to clarify
any concerns and the patient says not at this time as he is eating dinner.
Patient no longer vomitng, says nausea is better. Patient says that she
started toeat jello and the nausea and vomiting started. Encouraged patient to
hold off on eating until her nausea and vomiting is under control. Patient
asks if arabella spouse can come stay with her here in the hospital. Explain that
we are not allowing visitors in the hospital at this time due to increase in
COVID cases. Patient says that she understands.

## 2020-12-26 NOTE — NUR
Patient lying in bed with eyes open. Rates pain 2/10, feeling better. Did have
an episode of emesis. Asks if she starts to have pain when she can have the
Dilaudid and Ativan again. Explain that the Dilaudid can be given every two
hours and she can have that at any time. Explain that the Ativan ws just a one
time order. Patient says that the only thing that works is the Dilaudid and
Ativan together. Explain that Dr. Abreu was informed that she would like to
speak with her regarding pain management and when she comes in to see her she
can let her know what pain medication regimen works well for her. Patient says
that she is doing okay at this time and hopes that she does not have another
"pain attack". Patient asks if she can get her Estrace vaginal cream at this
time beings she missed both doses last week as she takes it on Monday and
Thursday. Explain that I would speak with the pharmacist to see if they can
put in the system for her to get a dose now and keep on the schedule for
MOnday and Thursdays. Patient verbalizes understanding. Denies any additional
needs at this time.

## 2020-12-26 NOTE — NUR
Rating pain in low abd 6-10/10, describes as sharp. Explains that it comes and
goes with severity. Was up and showered and then the pain started to happen.
Explains that her fentanyl patches are due to be changed today, will get with
hospitalist on getting orders for this. Administer Zofran for nausea per
patient request. Patient would also like Dilaudid. Will administer as
prescribed.

## 2020-12-26 NOTE — NUR
Patient calls spouse and this nurse explains what happened through the day and
medications that were administered. Spouse verbalizes understanding. Has
concerns about the procedure that will be done on Monday. Explain that we can
have Dr. Hooks contact him to have his questions and concerns clarified,
spouse says that he would appreciate that. Spouse explains that he is just
concerned as this is his wife and he wants to make sure that the correct
things are being done for her. Explain that I understand his concerns and we
want for him to get any questions or concerns that he has answered. Explain
that we are trying our best to take care of his wife and unfortunately she has
had a hard day with pain and nausea. Explain that it would be great to see
what the biopsy results and recommendations trav lbe from [facility] in regards
to if the tumor can be removed or if they need to do chemo. Patient spouse
says that they do not have that appointment until next Tuesday. Spouse
verbalizes understanding to all and says that he appreciates the staff taking
care of his wife. Denies any additional questions at this time. Patient gets
off phone with spouse. Patient asks if the Ativan was ordered for her to get
if she was to have a "pain attack" again. Explain that it was a one time order
and that an order was not placed for the Ativan. Patient says that she spoke
with Dr. Lois dominguez getting it ordered and she thought that the order was
being placed. Relook through MAR and explain to the patient that Ativan was
not placed as a PRN order. Patient says that she will retalk to Dr. Abreu
about getting it ordered for tomorrow. Patient says that she may try to rotate
the phenergan and Zofran to see if that helps her more. Assist patient in
changing gown and underwear and getting patient in comfortable position in
bed. Patient denies any additional needs or concerns at this time.

## 2020-12-26 NOTE — NUR
Sophie was relayed to this nurse  that the  patient spouse is upset is on face
time on the patient phone because he needs  an update regarding patient
status. This nurse goes to patient room and she is lying in bed with eyes
open.  Patient is not on phone at  this time. Explain to the patient that I
was informed that her spouse is upset regarding her care and is wanting
information. Patient says that he is not upset and eveerything is fine.  Ask
if we need to get him on face time while in the room at this time and patient
wants to wait. Patient provided Protonix as ordered. Patient then gets
nauseated and begins to vomit. Attempt to contact pharmacy to get phenergan,
no answer. Will mix phenergan with charge nurse at this time.

## 2020-12-26 NOTE — NUR
Call made to pharmacy to speak with pharmacist regarding estrace cream.
Pharmacist not available at the time and the [facility] will have pharmacist call
this nurse.

## 2020-12-26 NOTE — NUR
Resting in bed-c/o nausea. Zofran given per dr wilcox. Denies pain. Call light
in reach. Will  monitor.

## 2020-12-26 NOTE — NUR
Lying in bed with eyes open watching TV. Alert and oriented x4. Denies pain.
No nausea at this time. Patient says that she would like to shower later and
will let us know when she is ready. Denies needs at this time.

## 2020-12-26 NOTE — NUR
Fentanyl pacth applied to left shoulder/neck area. Patient screaming out in
pain and asks for Ativan. Explain that there is no Ativan ordered for her to
take. Patient says that the ER always gives her Ativan and Dilaudid together.
Explain that I would speak with the provider. Patient says that she needs
Ativan or more Dilaudid. Reexplain that we will speak with the provider to see
what more we can do. Spoke with THALIA Chan, and Dr. Abreu. Orders received for
Ativan. Will administer at this time.

## 2020-12-26 NOTE — NUR
PT CONTINUES TO COMPLAIN OF EPIGASTRIC PAIN. DR LAUREN ALLISON PROTONIX IV,
DOSE GIVEN AT THIS TIME. HAS IV SITE TO RIGHT AC, NO REDNESS OR SWELLING. IS
UP INDEPENDENTLY IN ROOM. HAS FENTANYL PATCH TO LEFT SHOULDER.

## 2020-12-26 NOTE — NUR
Patient calls out and is requesting Prilosec. Spoke with Dr. Abreu and orders
received. Will administer once approved from pharmacy.

## 2020-12-27 VITALS — DIASTOLIC BLOOD PRESSURE: 86 MMHG | TEMPERATURE: 99 F | HEART RATE: 75 BPM | SYSTOLIC BLOOD PRESSURE: 167 MMHG

## 2020-12-27 VITALS — DIASTOLIC BLOOD PRESSURE: 73 MMHG | TEMPERATURE: 99 F | HEART RATE: 80 BPM | SYSTOLIC BLOOD PRESSURE: 124 MMHG

## 2020-12-27 VITALS — HEART RATE: 68 BPM | DIASTOLIC BLOOD PRESSURE: 75 MMHG | SYSTOLIC BLOOD PRESSURE: 165 MMHG | TEMPERATURE: 97.2 F

## 2020-12-27 VITALS — TEMPERATURE: 97.3 F | DIASTOLIC BLOOD PRESSURE: 66 MMHG | HEART RATE: 65 BPM | SYSTOLIC BLOOD PRESSURE: 109 MMHG

## 2020-12-27 VITALS — TEMPERATURE: 97.7 F | DIASTOLIC BLOOD PRESSURE: 54 MMHG | SYSTOLIC BLOOD PRESSURE: 98 MMHG | HEART RATE: 63 BPM

## 2020-12-27 LAB
ANION GAP SERPL CALC-SCNC: 9 MMOL/L (ref 7–16)
BASOPHILS NFR BLD MANUAL: 3 % (ref 0–2)
BUN SERPL-MCNC: 8 MG/DL (ref 7–17)
CALCIUM SERPL-MCNC: 9 MG/DL (ref 8.4–10.2)
CHLORIDE SERPL-SCNC: 107 MMOL/L (ref 98–107)
CO2 SERPL-SCNC: 25 MMOL/L (ref 22–30)
CREAT SERPL-SCNC: 0.95 UMOL/L (ref 0.52–1.25)
EOSINOPHIL NFR BLD: 1 % (ref 0–4)
ERYTHROCYTE [DISTWIDTH] IN BLOOD BY AUTOMATED COUNT: 13.9 % (ref 11.5–14.5)
GLUCOSE SERPL-MCNC: 92 MG/DL (ref 74–106)
HCT VFR BLD AUTO: 42.2 % (ref 37–47)
HGB BLD-MCNC: 14.1 G/DL (ref 12.5–16)
LYMPHOCYTES NFR BLD MANUAL: 45 % (ref 20–51)
MCH RBC QN AUTO: 30 PG (ref 27–31)
MCHC RBC AUTO-ENTMCNC: 33 G/DL (ref 33–37)
MCV RBC AUTO: 91 FL (ref 80–100)
MONOCYTES NFR BLD: 8 % (ref 1.7–9.3)
NEUTS BAND NFR BLD: 1 % (ref 0–10)
NEUTS SEG NFR BLD MANUAL: 42 % (ref 42–75.2)
PLATELET # BLD AUTO: 330 K/MM3 (ref 130–400)
PLATELET BLD QL SMEAR: NORMAL
PMV BLD AUTO: 10 FL (ref 7.4–10.4)
POTASSIUM SERPL-SCNC: 4 MMOL/L (ref 3.4–5)
RBC # BLD AUTO: 4.63 M/MM3 (ref 4.1–5.3)
SODIUM SERPL-SCNC: 142 MMOL/L (ref 137–145)

## 2020-12-27 NOTE — NUR
PATIENT GIVEN PRN PAIN, NAUSEA AND ANXIETY MEDICATIONS THROUGHOUT THE SHIFT
WHEN NEEDED. PATIENT CURRENTLY ASLEEP IN ROOM. WILL REPORT OFF TO ONCOMING
NURSE.

## 2020-12-27 NOTE — NUR
PT ASKING FOR NAUSEA MEDS. GIVEN ZOFRAN 4MG IVP AT THIS TIME. PT VERY DROWSY.
HAS IVF TO RIGHT AC, NO REDNESS OR SWELLING. NOT ACTIVELY VOMITING AT THIS
TIME.

## 2020-12-27 NOTE — NUR
MEDICATED WITH SYNTHROID AND PHENERGAN 12.5MG IV AT THIS TIME. ASKING FOR MILK
OF MAG AND COLACE, WILL FOLLOW UP WITH DOCTOR TODAY.

## 2020-12-27 NOTE — NUR
PATIENT SHIFT ASSESSMENT COMPLETED. PATIENT CURRENTLY RATING HER ABDOMINAL
PAIN AS A 6/10 ON A 0-10 SCALE, AND DESCRIBES IT AS A SHARP PAIN. PATIENT ALSO
REPORTING NAUSEA THIS MORNING AND WAS GIVEN PRN ZOFRAN. PATIENT GIVEN ATIVAN
PER DOCTOR REQUEST. WILL CONTINUE TO MONITOR.

## 2020-12-27 NOTE — NUR
Plans to return home:
SW met with patient about DC plan. Pateint reports that she resides outside of
[place], [facility] with spouse Bill [phone], PCP is Dr. Valera. Patient reports that she uses [facility] for RX. Patient reports
being too sleepy to complete the rest of the assessment.  Will attempt later.

## 2020-12-28 VITALS — SYSTOLIC BLOOD PRESSURE: 133 MMHG | TEMPERATURE: 97.9 F | HEART RATE: 72 BPM | DIASTOLIC BLOOD PRESSURE: 67 MMHG

## 2020-12-28 VITALS — DIASTOLIC BLOOD PRESSURE: 71 MMHG | SYSTOLIC BLOOD PRESSURE: 147 MMHG | HEART RATE: 66 BPM

## 2020-12-28 VITALS — HEART RATE: 67 BPM | TEMPERATURE: 98.8 F | SYSTOLIC BLOOD PRESSURE: 122 MMHG | DIASTOLIC BLOOD PRESSURE: 65 MMHG

## 2020-12-28 VITALS — HEART RATE: 78 BPM | SYSTOLIC BLOOD PRESSURE: 139 MMHG | DIASTOLIC BLOOD PRESSURE: 95 MMHG

## 2020-12-28 VITALS — TEMPERATURE: 98.1 F | HEART RATE: 67 BPM | DIASTOLIC BLOOD PRESSURE: 68 MMHG | SYSTOLIC BLOOD PRESSURE: 121 MMHG

## 2020-12-28 VITALS — HEART RATE: 63 BPM | TEMPERATURE: 98.2 F | SYSTOLIC BLOOD PRESSURE: 115 MMHG | DIASTOLIC BLOOD PRESSURE: 69 MMHG

## 2020-12-28 VITALS — SYSTOLIC BLOOD PRESSURE: 120 MMHG | DIASTOLIC BLOOD PRESSURE: 57 MMHG | HEART RATE: 67 BPM

## 2020-12-28 VITALS — SYSTOLIC BLOOD PRESSURE: 139 MMHG | HEART RATE: 67 BPM | DIASTOLIC BLOOD PRESSURE: 65 MMHG

## 2020-12-28 VITALS — SYSTOLIC BLOOD PRESSURE: 139 MMHG | DIASTOLIC BLOOD PRESSURE: 65 MMHG | HEART RATE: 67 BPM

## 2020-12-28 VITALS — HEART RATE: 84 BPM | DIASTOLIC BLOOD PRESSURE: 82 MMHG | SYSTOLIC BLOOD PRESSURE: 140 MMHG

## 2020-12-28 VITALS — DIASTOLIC BLOOD PRESSURE: 86 MMHG | SYSTOLIC BLOOD PRESSURE: 141 MMHG | HEART RATE: 79 BPM

## 2020-12-28 VITALS — HEART RATE: 84 BPM | SYSTOLIC BLOOD PRESSURE: 134 MMHG | DIASTOLIC BLOOD PRESSURE: 84 MMHG

## 2020-12-28 VITALS — DIASTOLIC BLOOD PRESSURE: 70 MMHG | HEART RATE: 68 BPM | SYSTOLIC BLOOD PRESSURE: 124 MMHG

## 2020-12-28 VITALS — DIASTOLIC BLOOD PRESSURE: 82 MMHG | HEART RATE: 79 BPM | SYSTOLIC BLOOD PRESSURE: 145 MMHG

## 2020-12-28 VITALS — DIASTOLIC BLOOD PRESSURE: 80 MMHG | HEART RATE: 84 BPM | SYSTOLIC BLOOD PRESSURE: 134 MMHG

## 2020-12-28 VITALS — HEART RATE: 64 BPM | TEMPERATURE: 98.6 F | DIASTOLIC BLOOD PRESSURE: 62 MMHG | SYSTOLIC BLOOD PRESSURE: 102 MMHG

## 2020-12-28 VITALS — HEART RATE: 66 BPM | SYSTOLIC BLOOD PRESSURE: 137 MMHG | DIASTOLIC BLOOD PRESSURE: 70 MMHG

## 2020-12-28 VITALS — TEMPERATURE: 97.9 F | SYSTOLIC BLOOD PRESSURE: 133 MMHG | HEART RATE: 75 BPM | DIASTOLIC BLOOD PRESSURE: 67 MMHG

## 2020-12-28 VITALS — HEART RATE: 82 BPM | SYSTOLIC BLOOD PRESSURE: 136 MMHG | DIASTOLIC BLOOD PRESSURE: 66 MMHG

## 2020-12-28 LAB
ANION GAP SERPL CALC-SCNC: 5 MMOL/L (ref 7–16)
BASOPHILS # BLD: 0 10*3/UL (ref 0–0.2)
BASOPHILS NFR BLD AUTO: 0.4 % (ref 0–2)
BUN SERPL-MCNC: 7 MG/DL (ref 7–17)
BUN SERPL-MCNC: 8 MG/DL (ref 7–17)
CALCIUM SERPL-MCNC: 8.6 MG/DL (ref 8.4–10.2)
CHLORIDE SERPL-SCNC: 108 MMOL/L (ref 98–107)
CO2 SERPL-SCNC: 26 MMOL/L (ref 22–30)
CREAT SERPL-SCNC: 0.98 UMOL/L (ref 0.52–1.25)
CREAT SERPL-SCNC: 1.13 UMOL/L (ref 0.52–1.25)
EOSINOPHIL # BLD: 0.4 10*3/UL (ref 0–0.7)
EOSINOPHIL NFR BLD: 5 % (ref 0–4)
ERYTHROCYTE [DISTWIDTH] IN BLOOD BY AUTOMATED COUNT: 13.8 % (ref 11.5–14.5)
GLUCOSE SERPL-MCNC: 97 MG/DL (ref 74–106)
GRANULOCYTES # BLD AUTO: 56.4 % (ref 42.2–75.2)
HCT VFR BLD AUTO: 38 % (ref 37–47)
HGB BLD-MCNC: 12.3 G/DL (ref 12.5–16)
INR BLD: 1.3 (ref 0.8–3)
LYMPHOCYTES # BLD: 2.2 10*3/UL (ref 1.2–3.4)
LYMPHOCYTES NFR BLD: 30.7 % (ref 20–51)
MCH RBC QN AUTO: 30 PG (ref 27–31)
MCHC RBC AUTO-ENTMCNC: 32 G/DL (ref 33–37)
MCV RBC AUTO: 93 FL (ref 80–100)
MONOCYTES # BLD: 0.5 10*3/UL (ref 0.1–0.6)
MONOCYTES NFR BLD AUTO: 7.4 % (ref 1.7–9.3)
NEUTROPHILS # BLD: 3.9 10*3/UL (ref 1.4–6.5)
PLATELET # BLD AUTO: 353 K/MM3 (ref 130–400)
PMV BLD AUTO: 10.2 FL (ref 7.4–10.4)
POTASSIUM SERPL-SCNC: 4.1 MMOL/L (ref 3.4–5)
PROTHROMBIN TIME: 14 SECONDS (ref 9.7–12.8)
RBC # BLD AUTO: 4.09 M/MM3 (ref 4.1–5.3)
SODIUM SERPL-SCNC: 139 MMOL/L (ref 137–145)

## 2020-12-28 PROCEDURE — 0T9430Z DRAINAGE OF LEFT KIDNEY PELVIS WITH DRAINAGE DEVICE, PERCUTANEOUS APPROACH: ICD-10-PCS | Performed by: RADIOLOGY

## 2020-12-28 PROCEDURE — BT1FYZZ FLUOROSCOPY OF LEFT KIDNEY, URETER AND BLADDER USING OTHER CONTRAST: ICD-10-PCS | Performed by: RADIOLOGY

## 2020-12-28 NOTE — NUR
Spoke with Dr. Hooks regarding nephrostomy tube placement. He says that he
placed order this morning and Dr. Stiles in radiology will be performing the
procedure. Explain to Dr. Hooks when I spoke with the patient spouse on
Saturday he had questions about the procedure. Dr Moulton says that he will
call the spouse. Contacted radiology and they are looking at getting patient
in for tube placement around 1400 today.

## 2020-12-28 NOTE — NUR
procedure completed, tegraderm in place with drainage bage, approx 10-20cc of
urine in bag. pt rolled over to bed, able to help lift self up in bed, report
called to 3rd floor nurse, and transported via bed to room

## 2020-12-28 NOTE — NUR
Lying in bed with eyes closed. Opens eyes when name called out. Denies pain or
nausea. Patient drowsy and falls back to sleep. Explain to the patient that I
would keep checking on her and to let us know if she needs anything. Patient
opened eyes when she was spoken to, says okay, and goes back to sleep.

## 2020-12-28 NOTE — NUR
Pharmacy brought up phenergan, administer at this time. Patient says that the
Dilaudid is just not completely helping with the pain and she requests Ativan
as well. Will administer as prescribed.

## 2020-12-28 NOTE — NUR
Patient says that her spouse did not get a call from Dr. Stiles on how the
procedure went. Contact CT department and they will see if Dr. Stiles will call
the spouse and let this nurse know. Patient doing well at this time. Water
provided.

## 2020-12-28 NOTE — NUR
pt restless, states is uncomfortable at this point of procedure, repeated
versed 1mg iv per Dr Stiles

## 2020-12-28 NOTE — NUR
Discuss with the patient that she is on the schedule for nephrostomy tube
placement for this afternoon around 1400 and that Dr. Hooks will also contact
her  to get his questions and concerns clarified. Review consent for
procedure with the patient. Patient signs consent, will place on chart. Denies
any additional needs or concerns at this time.

## 2020-12-28 NOTE — NUR
Lying in bed with eyes open. Alert and oriented x4. Rates pain in abd 2/10,
tolerable at this time. Having nausea, will see if patient is due for
medication and if so administer as prescribed. Explain to the patient that she
is not on the schedule for the nephrostomy tube placement so we will need to
get this clarified. Patient verbalizes understanding. Denies additional needs.

## 2020-12-28 NOTE — NUR
Estradiol not given. Patient states she already had one this morning. Patient
states pain of 3/10 and doesn't want Dilaudid for now. She requested for
Zofran for her nausea. Drained nephrostomy tube.

## 2020-12-28 NOTE — NUR
Patient calls requesting phenergan and Dilaudid. Says that anytime she moves,
breathes or coughs her pain is a 8/0, administer Dilaudid as prescribed.
Contacted pharmacy and they sent up phenergan for the patient, will administer
at this time. Patient is resting in bed. Nephrostomy site CDI, light pink
drainage noted inbag. Denies additional needs.

## 2020-12-28 NOTE — NUR
Received report from [facility]. Seen patient awake, lying in bed. She is alert and
oriented. She denies pain and nausea right now. Nephrostomy tube intact and
draining well. With dressing on her left back. Call light within reach.

## 2020-12-28 NOTE — NUR
Lying in bed with eyes open. Asks if she needs to take her underwear off
before going to procedure. Explain that she should and that they normally call
prior to coming up to get her and I will let her know when they call. Patient
rates pain in low abd 1/10 at this time, no nausea. Denies additional needs.

## 2020-12-28 NOTE — NUR
procedure started, Dr Stiles here, versed 1mg iv given over 1 min and fentanyl
50mcg iv given over 2 min.

## 2020-12-28 NOTE — NUR
Rating pain in mid abd "anywhere from a 6-10" and requests pain medication.
Administer as prescribed. Patient also requests phenergan, awaiting pharmacy
to bring to this nurse and patient aware.

## 2020-12-28 NOTE — NUR
Patient having nausea and requests Zofran, administer as prescribed. CT staff
here to take patient for neph tube placement via bed at this time.

## 2020-12-29 VITALS — TEMPERATURE: 98.2 F | SYSTOLIC BLOOD PRESSURE: 164 MMHG | DIASTOLIC BLOOD PRESSURE: 79 MMHG | HEART RATE: 72 BPM

## 2020-12-29 VITALS — TEMPERATURE: 99.3 F | HEART RATE: 75 BPM | DIASTOLIC BLOOD PRESSURE: 63 MMHG | SYSTOLIC BLOOD PRESSURE: 131 MMHG

## 2020-12-29 VITALS — SYSTOLIC BLOOD PRESSURE: 119 MMHG | DIASTOLIC BLOOD PRESSURE: 65 MMHG | TEMPERATURE: 97.6 F | HEART RATE: 68 BPM

## 2020-12-29 VITALS — SYSTOLIC BLOOD PRESSURE: 103 MMHG | DIASTOLIC BLOOD PRESSURE: 72 MMHG | TEMPERATURE: 98.8 F | HEART RATE: 72 BPM

## 2020-12-29 VITALS — DIASTOLIC BLOOD PRESSURE: 64 MMHG | TEMPERATURE: 98.7 F | SYSTOLIC BLOOD PRESSURE: 122 MMHG | HEART RATE: 66 BPM

## 2020-12-29 VITALS — HEART RATE: 57 BPM | TEMPERATURE: 98 F | DIASTOLIC BLOOD PRESSURE: 69 MMHG | SYSTOLIC BLOOD PRESSURE: 118 MMHG

## 2020-12-29 NOTE — NUR
Contacted [facility], patient complaining of increased pain states its worse
than previous days. Medications have been given per orders. No new orders at
this time.

## 2020-12-29 NOTE — NUR
Initial visit; Patient thanked  for looking in on her and told
 that she has Stage 4 Colon Cancer.  spoke with Yumiko william and
then offered prayer.  let Yumiko know she is available to listen and pray
again. Yumiko thanked  who will check on Yumiko again tomorrow.

## 2020-12-29 NOTE — NUR
Received report from Mary Ann. Seen patient awake in bed. She denies pain right
now. Nephrotomy tube draining well. Dressing at the back is clean, dry and
intact. No complains of nausea. Call light within reach.

## 2020-12-29 NOTE — NUR
Patient has done well throughout the day, tolerating diet better this
afternoon. Potassium infusing per orders at decreased rate due to burning.
Patient has requested pain medications throughout the day, given per orders.
Having clear yellow urine from neph tube, flushed as needed. Iv restarted to
left forarm x 1 attempt this afternoon due to previous site leaking. Denies
further needs at this time. Reported off to night shift.

## 2020-12-29 NOTE — NUR
Patient still with nausea episodes and pain. Dilaudid, Phenergan and Zofran
were given. Dressing on her left back is clean, dry and intact.

## 2020-12-29 NOTE — NUR
The patient is to tentatively d/c tomorrow. SW met with the patient to follow
up and review d/c plan. The patient states that she is sleepy, but doing
better. She states that she is ready to get back home and is hopeful to not
have any more nausea. She had no concerns for SW about returning home. SW to
follow as needed.

## 2020-12-30 VITALS — TEMPERATURE: 98.6 F | HEART RATE: 66 BPM | SYSTOLIC BLOOD PRESSURE: 128 MMHG | DIASTOLIC BLOOD PRESSURE: 62 MMHG

## 2020-12-30 VITALS — DIASTOLIC BLOOD PRESSURE: 51 MMHG | HEART RATE: 64 BPM | SYSTOLIC BLOOD PRESSURE: 110 MMHG | TEMPERATURE: 99.5 F

## 2020-12-30 VITALS — TEMPERATURE: 98.7 F | HEART RATE: 63 BPM | SYSTOLIC BLOOD PRESSURE: 130 MMHG | DIASTOLIC BLOOD PRESSURE: 57 MMHG

## 2020-12-30 VITALS — DIASTOLIC BLOOD PRESSURE: 57 MMHG | SYSTOLIC BLOOD PRESSURE: 108 MMHG | HEART RATE: 72 BPM | TEMPERATURE: 99 F

## 2020-12-30 VITALS — DIASTOLIC BLOOD PRESSURE: 69 MMHG | SYSTOLIC BLOOD PRESSURE: 128 MMHG | HEART RATE: 58 BPM | TEMPERATURE: 97.8 F

## 2020-12-30 VITALS — SYSTOLIC BLOOD PRESSURE: 126 MMHG | DIASTOLIC BLOOD PRESSURE: 66 MMHG | HEART RATE: 62 BPM | TEMPERATURE: 98.6 F

## 2020-12-30 VITALS — HEART RATE: 61 BPM | TEMPERATURE: 98.2 F | DIASTOLIC BLOOD PRESSURE: 73 MMHG | SYSTOLIC BLOOD PRESSURE: 137 MMHG

## 2020-12-30 PROCEDURE — 02HV33Z INSERTION OF INFUSION DEVICE INTO SUPERIOR VENA CAVA, PERCUTANEOUS APPROACH: ICD-10-PCS

## 2020-12-30 NOTE — NUR
Attempted to get patient placed somewhere today. Was not successful. Patient
at this time is planning to go home and give herself IV zofran for nausea. She
did not have pain today that required pain medications. She has good output
from her nephrostomy tube. Output is light yellow/pink. No clots noted. Had
her practice giving herself zofran twice this afternoon and she did good with
it. Patient is a nurse. PICC line placed earlier today. Patients Neph tube
started leaking. It is leaking at the connector, not the bag. We changed it
and so far it is not leaking. Explained will ovserve tonight and see about
finding a replacement tomorrow. No other changes at this time. Call light
within reach.

## 2020-12-30 NOTE — NUR
Lamar, at [facility], reports that they have declined the patient, due to the
patient being independent in her room. The patient's RN then informed NATHAN that
the patient is going to be prescribed IV Zofran when she returns home. NATHAN
contacted the patient's preferred pharmacy, [facility] in [facility], to inquire if they
can fill this prescription. The pharmacist reports that they rarely ever feel
anything IV and is unsure if they can. He states that the hospital can send
them a script for this and then he can find out. NATHAN contacted [facility] to inquire if they have the med. The pharmacist reports that they do
have the IV Zofran in stock. She states that IV Zofran is very expensive and
does not know if insurance will pay for it. NATHAN then met with the patient to
review d/c plan and to update on the above information. The patient's ,
Bill, was on speaker phone. The patient and her  report that they feel
comfortable administering the IV Zofran on their own. The patient's 
reports that [facility] is out of network with their insurance. They would
rather use their preferred pharmacy, [facility] in [facility], and see what insurance
covers for the med. NATHAN also discussed home health services. The patient was
interested in this and chose [facility]. NATHAN contacted and faxed a referral
to Rossi at [facility]. Rossi reports that she needs to check with the [place]
office, on whether they take the patient's insurance. NATHAN obtained the IV
Zofran script from the hospitalist and provided the script to other NATHAN,
Zita.
 
The patient's , Bill, then contacted this NATHAN back. Bill expressed his
frustrations and his concerns. He would like to talk to a doctor or PA. NATHAN
notified the PA and requested that they give Bill a call.

## 2020-12-30 NOTE — NUR
Follow-up visit; Patient having some concerns about where she is going when
she is discharged.  offered encouragement and after having spoken with
a  let Yumiko know the  had a plan to meet with her to
discuss her options.

## 2020-12-30 NOTE — NUR
Received report from Chloé. Seen patient awake in bed. She denies pain.
Still with some nausea. She has been giving herself Zofran IV assisted by the
bedside nurse for her to be able to be comfortable when she gets home. She
requested for tonight that if she needs one, if I can give it since she might
be sleepy to do it for herself. Call light within reach.

## 2020-12-30 NOTE — NUR
Patient called saying her nephrostomy tube is leaking again. It was on the
connector. Called Luis Enrique MURILLO to double check where the leak is coming from.
Connector was tightened and place a new dry towel underneath the bag to see if
it would leak again.

## 2020-12-30 NOTE — NUR
NATHAN contacted Tee with the [facility]. He would like the script
faxed to them. NATHAN faxed script. He will check to see if the Zofran can be
ordered or if the have it in stock. He will also check patient's insurance.
Tee states he will likely need a script for the saline flush too. NATHAN
collaborated the above information with the patient's nurse.

## 2020-12-30 NOTE — NUR
Patient still would request for Zofran for her nausea. Denies pain this
morning. Dilaudid given once last night.

## 2020-12-30 NOTE — NUR
The patient's nurse staffed with this  regarding the patient
possibly qualifying for [facility] Bed. NATHAN contacted Lamar with [facility]
regarding the referral. The team will review it then she will inform this SW
of their decision.

## 2020-12-31 VITALS — DIASTOLIC BLOOD PRESSURE: 87 MMHG | HEART RATE: 65 BPM | TEMPERATURE: 98.5 F | SYSTOLIC BLOOD PRESSURE: 178 MMHG

## 2020-12-31 VITALS — TEMPERATURE: 99.1 F | DIASTOLIC BLOOD PRESSURE: 62 MMHG | HEART RATE: 63 BPM | SYSTOLIC BLOOD PRESSURE: 124 MMHG

## 2020-12-31 VITALS — SYSTOLIC BLOOD PRESSURE: 136 MMHG | TEMPERATURE: 97.8 F | DIASTOLIC BLOOD PRESSURE: 66 MMHG | HEART RATE: 69 BPM

## 2020-12-31 LAB
ANION GAP SERPL CALC-SCNC: 6 MMOL/L (ref 7–16)
BUN SERPL-MCNC: 9 MG/DL (ref 7–17)
CALCIUM SERPL-MCNC: 9 MG/DL (ref 8.4–10.2)
CHLORIDE SERPL-SCNC: 103 MMOL/L (ref 98–107)
CO2 SERPL-SCNC: 28 MMOL/L (ref 22–30)
CREAT SERPL-SCNC: 0.95 UMOL/L (ref 0.52–1.25)
GLUCOSE SERPL-MCNC: 109 MG/DL (ref 74–106)
POTASSIUM SERPL-SCNC: 3.9 MMOL/L (ref 3.4–5)
SODIUM SERPL-SCNC: 137 MMOL/L (ref 137–145)

## 2020-12-31 NOTE — NUR
Patient is discharging today. We are working on getting all her orders and
medications figured out. She is aware it may take a few hours. She got up to
have a bowel movement and her pain jumped up to about 8 on a 0-10 scale. She
stated that has not happened with previous bowel movements. We discussed
patient medications ordered and how often she can have them. Also discussed
what she will be discharged with at home for pain coverage. Denies nausea at
this time. She stated she was also having some anxiety about the pain, ativan
given. No other changes at this time. Call light within reach. Bed alarm on.

## 2020-12-31 NOTE — NUR
Patient is discharging home. Discharge instructions discussed with patient.
Explained how to trouble shoot any issues she might have with her PICC line.
Explained to cover the drain and PICC line when she showers. Discussed how to
drain her Neph tube. Sent her with a few supplies for dressing changes to Neph
tube site per request from home health nurse. Her  is picking up her
flushes and zofran from the pharmacy. 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 Explained her medication changes. No questions
verbalized. Explained when her follow up appointments are. Copies of discharge
instructions sent with patient. All belongings packed up and sent with
patient. Patient walked out via wheel chair by Aisha CUEVAS.

## 2020-12-31 NOTE — NUR
Patient had uneventful night. She denies pain. Zofran given one time last
night. No episodes of leaking on her nephrostomy tube.

## 2020-12-31 NOTE — NUR
NATHAN contacted [facility] in [place] to follow up on the IV Zofran. Luciana, at [facility],
reports that they are unable to fill any IV medications. NATHAN updated the
patient's , Bill. Bill wants to make sure the pharmacy is in network
with the patient's insurance. NATHAN contacted [facility] and [facility]. They do not have the med in stock. Pamela reports that they do
not fill IV medications. All three pharmacies reports that they believe they
are in network with [facility] though. NATHAN then contacted [facility]armacy. The pharmacist reports that [facility] already forwarded the IV Zofran
script to them. The pharmacist reports that she checked with the patient's
insurance and they will not pay for the IV Zofran. She reports that the total
for the IV Zofran is only $35.70 though.
 
Jennifer, at [facility], reports that they can accept the patient and that they
are in network with the patient's insurance. Their [place] office checked the
patient's insurance plan and for the patient's 2020 plan, everything is
covered at 100%. Starting tomorrow, 1/1/2021, the patient will have a $1,500
deductible to pay before insurance covers anything. After that, insurance will
cover 80/20 until her out of pocket is met, which is $5,500. An RN visit would
cost approximately $170 until deductible and out of pocket are met. NATHAN met
with the patient to update. The patient reports that she would still like to
pursue with home health for a little bit, to help with her dressing change on
her drain and provide education to her .
 
NATHAN contacted and updated the patient's , Bill, on the above
information. Bill is agreeable to getting the patient's IV Zofran at
[facility]. Bill is interested in [facility] coming tonight to help. NATHAN
informed Jennifer at [facility] of this. Jennifer reports that they will come see
the patient tonight and will call the patient to schedule a time. NATHAN informed
Bill. NATHAN updated the patient's RN on the above information. No additional
needs at this time.

## 2021-01-01 NOTE — NUR
Patient back to room via bed. Alert and oriented x4. Says "I feel better".
Only has pain with taking deep breath. Denies nausea. Dressing to left flank
area CDI. Nephrostomy tube intact draining pink colored drainage into bag. Bag
laying on bed beside patient. Patient denies additional needs at this time. Right Hand/Right Foot

## 2021-01-18 ENCOUNTER — HOSPITAL ENCOUNTER (OUTPATIENT)
Dept: HOSPITAL 19 - COL.RAD | Age: 61
End: 2021-01-18
Attending: RADIOLOGY
Payer: COMMERCIAL

## 2021-01-18 DIAGNOSIS — C19: Primary | ICD-10-CM

## 2021-01-19 ENCOUNTER — HOSPITAL ENCOUNTER (OUTPATIENT)
Dept: HOSPITAL 19 - COL.RAD | Age: 61
End: 2021-01-19
Attending: UROLOGY
Payer: COMMERCIAL

## 2021-01-19 VITALS — SYSTOLIC BLOOD PRESSURE: 100 MMHG | HEART RATE: 58 BPM | DIASTOLIC BLOOD PRESSURE: 45 MMHG

## 2021-01-19 VITALS — SYSTOLIC BLOOD PRESSURE: 114 MMHG | DIASTOLIC BLOOD PRESSURE: 82 MMHG | HEART RATE: 59 BPM

## 2021-01-19 VITALS — HEART RATE: 63 BPM | SYSTOLIC BLOOD PRESSURE: 99 MMHG | DIASTOLIC BLOOD PRESSURE: 64 MMHG

## 2021-01-19 VITALS — SYSTOLIC BLOOD PRESSURE: 107 MMHG | DIASTOLIC BLOOD PRESSURE: 69 MMHG | HEART RATE: 61 BPM

## 2021-01-19 VITALS — BODY MASS INDEX: 24.89 KG/M2 | WEIGHT: 164.24 LBS | HEIGHT: 68.04 IN

## 2021-01-19 VITALS — SYSTOLIC BLOOD PRESSURE: 101 MMHG | HEART RATE: 68 BPM | DIASTOLIC BLOOD PRESSURE: 45 MMHG

## 2021-01-19 VITALS — HEART RATE: 59 BPM | DIASTOLIC BLOOD PRESSURE: 82 MMHG | SYSTOLIC BLOOD PRESSURE: 114 MMHG

## 2021-01-19 DIAGNOSIS — N13.1: Primary | ICD-10-CM

## 2021-01-19 NOTE — NUR
PT RESTS ON CART, DR LOUIS HERE FOR NEPH TUBE REMOVAL, VERSED 1MG IV GIVEN
AND FENTANYL 25 MCG IV. PROCEDURE COMPLETED AND 2X2 OVER RIGHT FLANK AREA,
SITE IS CLEAN, NO REDNESS OR SWELLING. PT WAITING FOR CT SCAN NOW

## 2021-01-19 NOTE — NUR
DR LOUIS HERE IN RAD HOLDING  AREA, PT ON STRETCHER ON RIGHT SIDE FOR
REMOVAL OF NEPH TUBE. AREA AROUND TUBE, CLEAR, NO REDNESS OR SWELLING, IV
VERSED 1MG IV AND FENTANYL 25MCG IV GIVEN AS ORDERED THROUGH PICC.

## 2021-01-28 ENCOUNTER — HOSPITAL ENCOUNTER (OUTPATIENT)
Dept: HOSPITAL 19 - EUO | Age: 61
Discharge: HOME | End: 2021-01-28
Attending: INTERNAL MEDICINE
Payer: COMMERCIAL

## 2021-01-28 VITALS — TEMPERATURE: 98 F | SYSTOLIC BLOOD PRESSURE: 121 MMHG | HEART RATE: 58 BPM | DIASTOLIC BLOOD PRESSURE: 57 MMHG

## 2021-01-28 VITALS — HEIGHT: 67.99 IN | BODY MASS INDEX: 24.73 KG/M2 | WEIGHT: 163.14 LBS

## 2021-01-28 DIAGNOSIS — C18.7: Primary | ICD-10-CM

## 2021-01-28 NOTE — NUR
Here for PICC assessment. Right upper arm PICC intact with both ports flushed
with 10ml normal saline with red port with good blood return  noted. purple
port flushed hard with a blood return as incomplete obstruction. Will advise
cath-estevan. EU RN informed.

## 2021-02-13 ENCOUNTER — HOSPITAL ENCOUNTER (EMERGENCY)
Dept: HOSPITAL 19 - COL.ER | Age: 61
Discharge: HOME | End: 2021-02-13
Attending: FAMILY MEDICINE
Payer: COMMERCIAL

## 2021-02-13 VITALS — HEART RATE: 70 BPM | DIASTOLIC BLOOD PRESSURE: 86 MMHG | SYSTOLIC BLOOD PRESSURE: 168 MMHG

## 2021-02-13 VITALS — HEIGHT: 67.99 IN | BODY MASS INDEX: 24.29 KG/M2 | WEIGHT: 160.28 LBS

## 2021-02-13 VITALS — TEMPERATURE: 100.3 F

## 2021-02-13 DIAGNOSIS — Z88.6: ICD-10-CM

## 2021-02-13 DIAGNOSIS — Z20.822: ICD-10-CM

## 2021-02-13 DIAGNOSIS — R10.13: Primary | ICD-10-CM

## 2021-02-13 DIAGNOSIS — Z88.8: ICD-10-CM

## 2021-02-13 DIAGNOSIS — E87.6: ICD-10-CM

## 2021-02-13 DIAGNOSIS — Z85.038: ICD-10-CM

## 2021-02-13 DIAGNOSIS — R11.2: ICD-10-CM

## 2021-02-13 LAB
ALBUMIN SERPL-MCNC: 4.6 GM/DL (ref 3.5–5)
ALP SERPL-CCNC: 104 U/L (ref 50–136)
ALT SERPL-CCNC: 19 U/L (ref 4–34)
ANION GAP SERPL CALC-SCNC: 10 MMOL/L (ref 7–16)
AST SERPL-CCNC: 28 U/L (ref 15–37)
BASOPHILS # BLD: 0 10*3/UL (ref 0–0.2)
BASOPHILS NFR BLD AUTO: 0.6 % (ref 0–2)
BILIRUB SERPL-MCNC: 0.5 MG/DL (ref 0–1)
BUN SERPL-MCNC: 16 MG/DL (ref 7–17)
CALCIUM SERPL-MCNC: 9.6 MG/DL (ref 8.4–10.2)
CHLORIDE SERPL-SCNC: 104 MMOL/L (ref 98–107)
CO2 SERPL-SCNC: 25 MMOL/L (ref 22–30)
CREAT SERPL-SCNC: 1.12 UMOL/L (ref 0.52–1.25)
CRP SERPL-MCNC: 1 MG/DL (ref 0–0.9)
EOSINOPHIL # BLD: 0 10*3/UL (ref 0–0.7)
EOSINOPHIL NFR BLD: 0.3 % (ref 0–4)
ERYTHROCYTE [DISTWIDTH] IN BLOOD BY AUTOMATED COUNT: 14.2 % (ref 11.5–14.5)
GLUCOSE SERPL-MCNC: 139 MG/DL (ref 74–106)
GRANULOCYTES # BLD AUTO: 85.3 % (ref 42.2–75.2)
HCT VFR BLD AUTO: 40.3 % (ref 37–47)
HGB BLD-MCNC: 12.9 G/DL (ref 12.5–16)
LIPASE SERPL-CCNC: 165 U/L (ref 23–300)
LYMPHOCYTES # BLD: 0.6 10*3/UL (ref 1.2–3.4)
LYMPHOCYTES NFR BLD: 9.4 % (ref 20–51)
MCH RBC QN AUTO: 28 PG (ref 27–31)
MCHC RBC AUTO-ENTMCNC: 32 G/DL (ref 33–37)
MCV RBC AUTO: 87 FL (ref 80–100)
MONOCYTES # BLD: 0.3 10*3/UL (ref 0.1–0.6)
MONOCYTES NFR BLD AUTO: 4.1 % (ref 1.7–9.3)
NEUTROPHILS # BLD: 5.7 10*3/UL (ref 1.4–6.5)
PH UR STRIP.AUTO: 8 [PH] (ref 5–8)
PLATELET # BLD AUTO: 199 K/MM3 (ref 130–400)
PMV BLD AUTO: 11.6 FL (ref 7.4–10.4)
POTASSIUM SERPL-SCNC: 3.7 MMOL/L (ref 3.4–5)
PROT SERPL-MCNC: 8.7 GM/DL (ref 6.4–8.2)
RBC # BLD AUTO: 4.65 M/MM3 (ref 4.1–5.3)
RBC # UR: (no result) /HPF
SODIUM SERPL-SCNC: 139 MMOL/L (ref 137–145)
SP GR UR STRIP.AUTO: 1.02 (ref 1–1.03)
SQUAMOUS # URNS: (no result) /HPF
URN COLLECT METHOD CLASS: (no result)

## 2021-02-13 PROCEDURE — C9113 INJ PANTOPRAZOLE SODIUM, VIA: HCPCS

## 2021-02-14 ENCOUNTER — HOSPITAL ENCOUNTER (INPATIENT)
Dept: HOSPITAL 19 - COL.ER | Age: 61
LOS: 3 days | Discharge: HOME | DRG: 71 | End: 2021-02-17
Attending: HOSPITALIST | Admitting: HOSPITALIST
Payer: COMMERCIAL

## 2021-02-14 VITALS — SYSTOLIC BLOOD PRESSURE: 180 MMHG | TEMPERATURE: 97.9 F | HEART RATE: 93 BPM | DIASTOLIC BLOOD PRESSURE: 83 MMHG

## 2021-02-14 VITALS — SYSTOLIC BLOOD PRESSURE: 163 MMHG | TEMPERATURE: 99.5 F | DIASTOLIC BLOOD PRESSURE: 86 MMHG | HEART RATE: 85 BPM

## 2021-02-14 VITALS — DIASTOLIC BLOOD PRESSURE: 85 MMHG | TEMPERATURE: 98.8 F | HEART RATE: 84 BPM | SYSTOLIC BLOOD PRESSURE: 165 MMHG

## 2021-02-14 DIAGNOSIS — I67.83: Primary | ICD-10-CM

## 2021-02-14 DIAGNOSIS — Z88.6: ICD-10-CM

## 2021-02-14 DIAGNOSIS — E87.6: ICD-10-CM

## 2021-02-14 DIAGNOSIS — N36.8: ICD-10-CM

## 2021-02-14 DIAGNOSIS — E03.9: ICD-10-CM

## 2021-02-14 DIAGNOSIS — G89.29: ICD-10-CM

## 2021-02-14 DIAGNOSIS — Z88.1: ICD-10-CM

## 2021-02-14 DIAGNOSIS — C18.9: ICD-10-CM

## 2021-02-14 DIAGNOSIS — R11.2: ICD-10-CM

## 2021-02-14 DIAGNOSIS — N39.0: ICD-10-CM

## 2021-02-14 DIAGNOSIS — T45.1X5A: ICD-10-CM

## 2021-02-14 DIAGNOSIS — F32.9: ICD-10-CM

## 2021-02-14 DIAGNOSIS — G62.9: ICD-10-CM

## 2021-02-14 LAB
ALBUMIN SERPL-MCNC: 5.4 GM/DL (ref 3.5–5)
ALP SERPL-CCNC: 96 U/L (ref 50–136)
ALT SERPL-CCNC: 30 U/L (ref 4–34)
ANION GAP SERPL CALC-SCNC: 17 MMOL/L (ref 7–16)
AST SERPL-CCNC: 44 U/L (ref 15–37)
BILIRUB SERPL-MCNC: 0.7 MG/DL (ref 0–1)
BUN SERPL-MCNC: 16 MG/DL (ref 7–17)
CALCIUM SERPL-MCNC: 11 MG/DL (ref 8.4–10.2)
CHLORIDE SERPL-SCNC: 102 MMOL/L (ref 98–107)
CO2 SERPL-SCNC: 20 MMOL/L (ref 22–30)
CREAT SERPL-SCNC: 1.23 UMOL/L (ref 0.52–1.25)
ERYTHROCYTE [DISTWIDTH] IN BLOOD BY AUTOMATED COUNT: 14.3 % (ref 11.5–14.5)
GLUCOSE SERPL-MCNC: 142 MG/DL (ref 74–106)
HCT VFR BLD AUTO: 44.2 % (ref 37–47)
HGB BLD-MCNC: 14.3 G/DL (ref 12.5–16)
LIPASE SERPL-CCNC: 121 U/L (ref 23–300)
LYMPHOCYTES NFR BLD MANUAL: 1 % (ref 20–51)
MCH RBC QN AUTO: 28 PG (ref 27–31)
MCHC RBC AUTO-ENTMCNC: 32 G/DL (ref 33–37)
MCV RBC AUTO: 86 FL (ref 80–100)
MONOCYTES NFR BLD: 3 % (ref 1.7–9.3)
NEUTS SEG NFR BLD MANUAL: 96 % (ref 42–75.2)
PLATELET # BLD AUTO: 287 K/MM3 (ref 130–400)
PLATELET BLD QL SMEAR: NORMAL
PMV BLD AUTO: 11 FL (ref 7.4–10.4)
POTASSIUM SERPL-SCNC: 3.8 MMOL/L (ref 3.4–5)
PROT SERPL-MCNC: 10.4 GM/DL (ref 6.4–8.2)
RBC # BLD AUTO: 5.15 M/MM3 (ref 4.1–5.3)
SODIUM SERPL-SCNC: 139 MMOL/L (ref 137–145)
TSH SERPL DL<=0.005 MIU/L-ACNC: 14.1 UIU/ML (ref 0.47–4.68)

## 2021-02-14 PROCEDURE — A9585 GADOBUTROL INJECTION: HCPCS

## 2021-02-14 PROCEDURE — G0378 HOSPITAL OBSERVATION PER HR: HCPCS

## 2021-02-14 NOTE — NUR
Patient  arrived at this time and is very upset, myself and 
went in and discussed plan of care with the patient and , I gave
patient some IV Fentanyl at that time as well per  orders,  was
appreciative and verbalized understanding of plan of care, patient appeared to
get some relief from IV pain medicatons, will continue to monitor

## 2021-02-14 NOTE — NUR
Admission assessment completed at this time to the best of my ability given
the state of the patients condition, patient was screaming for pain meds as
the ER staff wheeled her down the hallway to her room 318/ I met them in the
room and helped transfer her from ER stretcher to the bed, she continuously
demanded "the strongest pain meds you have right now", I explained to the
patient once we got her settled I would go directly to speak with the
attending physican about her care, she at this time was unable to follow
commands or answer questions appropratley at she was hysterical about being in
pain and continued to demand pain medications, patient did not appear to be in
any resp.distress/ lungs CTA, heart regular/ slightly tachycardic upon
arrival, distal pulses are palpable,  no skin issues/ breakdown noted, patient
did not have a home medication list and when asked about home meds she said "I
dont know", she continued to thrash around in bed and demand pain meds all the
while, I called  and informed him about the patient condition and
request and he said he would be in to see her within the next 15 minutes, i
explained this to the patient whom was angry and continued to demand pain
medications "right now", at this time I then called her  to get a
little more information/ he too was obvioulsy upset and dishoveled in our
conversation and stated "the medical community here sucks" because he already
told EMS her medications, I explained that we go over medications list anytime
a patient moves to a different department as a safety measure to insure
accuracy of meds, he did read off all her meds to me over the phone and stated
he was unsure what and when she  had taken any of them last and that he would
come to the hospital when he could,  patient has a right upper arm PICC line
in place that I am unsure of at this time when/ where that was placed /
 reports he uses this at home for her Zofran administration, bed alarm
set at this time, seiz p/c in place

## 2021-02-15 VITALS — HEART RATE: 84 BPM | DIASTOLIC BLOOD PRESSURE: 84 MMHG | TEMPERATURE: 98.3 F | SYSTOLIC BLOOD PRESSURE: 154 MMHG

## 2021-02-15 VITALS — TEMPERATURE: 98.3 F | DIASTOLIC BLOOD PRESSURE: 94 MMHG | HEART RATE: 72 BPM | SYSTOLIC BLOOD PRESSURE: 161 MMHG

## 2021-02-15 VITALS — HEART RATE: 79 BPM | DIASTOLIC BLOOD PRESSURE: 86 MMHG | SYSTOLIC BLOOD PRESSURE: 163 MMHG | TEMPERATURE: 98.3 F

## 2021-02-15 VITALS — SYSTOLIC BLOOD PRESSURE: 163 MMHG | DIASTOLIC BLOOD PRESSURE: 87 MMHG | TEMPERATURE: 100.1 F | HEART RATE: 70 BPM

## 2021-02-15 VITALS — HEART RATE: 81 BPM | TEMPERATURE: 99.2 F | DIASTOLIC BLOOD PRESSURE: 93 MMHG | SYSTOLIC BLOOD PRESSURE: 153 MMHG

## 2021-02-15 LAB
ANION GAP SERPL CALC-SCNC: 11 MMOL/L (ref 7–16)
BUN SERPL-MCNC: 17 MG/DL (ref 7–17)
CALCIUM SERPL-MCNC: 9.2 MG/DL (ref 8.4–10.2)
CHLORIDE SERPL-SCNC: 108 MMOL/L (ref 98–107)
CO2 SERPL-SCNC: 24 MMOL/L (ref 22–30)
CREAT SERPL-SCNC: 1.01 UMOL/L (ref 0.52–1.25)
DRUGS UR SCN NOM: NEGATIVE NG/ML
ERYTHROCYTE [DISTWIDTH] IN BLOOD BY AUTOMATED COUNT: 14.6 % (ref 11.5–14.5)
GLUCOSE SERPL-MCNC: 121 MG/DL (ref 74–106)
HCT VFR BLD AUTO: 39.3 % (ref 37–47)
HGB BLD-MCNC: 12.7 G/DL (ref 12.5–16)
MCH RBC QN AUTO: 28 PG (ref 27–31)
MCHC RBC AUTO-ENTMCNC: 32 G/DL (ref 33–37)
MCV RBC AUTO: 87 FL (ref 80–100)
PH UR STRIP.AUTO: 6 [PH] (ref 5–8)
PLATELET # BLD AUTO: 228 K/MM3 (ref 130–400)
PMV BLD AUTO: 12 FL (ref 7.4–10.4)
POTASSIUM SERPL-SCNC: 3.3 MMOL/L (ref 3.4–5)
RBC # BLD AUTO: 4.5 M/MM3 (ref 4.1–5.3)
RBC # UR: (no result) /HPF
SODIUM SERPL-SCNC: 143 MMOL/L (ref 137–145)
SP GR UR STRIP.AUTO: 1.01 (ref 1–1.03)
SQUAMOUS # URNS: (no result) /HPF
UA DIPSTICK PNL UR STRIP.AUTO: (no result)
URINE LEUKOCYTE ESTERASE: (no result)
URN COLLECT METHOD CLASS: (no result)

## 2021-02-15 NOTE — NUR
PT EXTREMELY DROWSY IN THE ROOM, WOULD NOT OPEN EYES FOR ME IN ROOM, WOULD
RESPOND DELAYED, PT REPORTS NAUSEA, REPORTS PAIN BUT "IF I DON'T MOVE I DON'T
HAVE ANY". PAIN MEDS NOT GIVEN DUE TO PT LETHARGY. PT MEDICATIONS NOT GIVEN
BECAUSE PT WOULD NOT OPEN EYES TO TAKE THEM, PT WOULD FALL ASLEEP RIGHT AFTER
FINISHING HER SENTENCE. NO OTHER NEEDS.

## 2021-02-15 NOTE — NUR
Bedside shift report received from CHIDI Beck. At time of assessment,  is
at bedside; patient is awake, drowsy in bed. She complains of upper abdominal
pain 8/10 and PRN Roxanol 5 mg is administered. On her left hand to her left
forearm a red, pitechiael rash is observed; THALIA Hazel is notified and this RN
will continue to closely monitor. The rash does not cause patient any
discomfort. Comfort measures met; call light in reach.

## 2021-02-15 NOTE — NUR
Patient admitted with PICC right upper arm, dressing dated 2/7/21. no
chlorhexidine impregnated disk present. PICC dressing change done with sterile
technique with insertion site cleansed with cloraprep x 1, chlorhexidine
impregnated disk applied, skin prep, stat lock, and tegaderm applied. no sings
or symptoms of IV complications noted. no concerns voiced.

## 2021-02-15 NOTE — NUR
Initial visit; Patient awake,  spoke with her  and offered
a blessing. Yumiko not feeling like interacting.  will continue to keep
her in 's prayers.

## 2021-02-15 NOTE — NUR
PT APPEARS CONFUSED, PT REPORTING PAIN LATER IN DAY RELIEVED BY ROXANOL,
NAUSEA RELIEVED WITH ZOFRAN AND PHENERGAN. PT USING BEDPAN BECAUSE PT STATED
"IM COLD". PT HAD EEG AND MRI. FAMILY UPDATED BY NURSING STAFF. DROWSY IN ROOM
MOST OF DAY, NO OTHER NEEDS.

## 2021-02-15 NOTE — NUR
TALKED TO  AND DAUGHTER MULTIPLE TIMES DURING THE DAY WITH UPDATES.
DAUGHTER PHONE NUMBER GIVEN TO PHYSICIAN TO GO OVER MRI RESULTS.

## 2021-02-16 VITALS — SYSTOLIC BLOOD PRESSURE: 132 MMHG | TEMPERATURE: 98.5 F | HEART RATE: 60 BPM | DIASTOLIC BLOOD PRESSURE: 80 MMHG

## 2021-02-16 VITALS — DIASTOLIC BLOOD PRESSURE: 64 MMHG | TEMPERATURE: 98.1 F | HEART RATE: 61 BPM | SYSTOLIC BLOOD PRESSURE: 107 MMHG

## 2021-02-16 VITALS — HEART RATE: 57 BPM | DIASTOLIC BLOOD PRESSURE: 74 MMHG | TEMPERATURE: 98.3 F | SYSTOLIC BLOOD PRESSURE: 129 MMHG

## 2021-02-16 VITALS — DIASTOLIC BLOOD PRESSURE: 71 MMHG | SYSTOLIC BLOOD PRESSURE: 115 MMHG | HEART RATE: 67 BPM | TEMPERATURE: 99.4 F

## 2021-02-16 VITALS — DIASTOLIC BLOOD PRESSURE: 88 MMHG | HEART RATE: 65 BPM | TEMPERATURE: 99.3 F | SYSTOLIC BLOOD PRESSURE: 174 MMHG

## 2021-02-16 VITALS — HEART RATE: 65 BPM | DIASTOLIC BLOOD PRESSURE: 56 MMHG | SYSTOLIC BLOOD PRESSURE: 112 MMHG | TEMPERATURE: 98.8 F

## 2021-02-16 VITALS — TEMPERATURE: 98.7 F | HEART RATE: 80 BPM | DIASTOLIC BLOOD PRESSURE: 59 MMHG | SYSTOLIC BLOOD PRESSURE: 117 MMHG

## 2021-02-16 LAB
ANION GAP SERPL CALC-SCNC: 10 MMOL/L (ref 7–16)
APPEARANCE CSF: CLEAR
BASOPHILS # BLD: 0.1 10*3/UL (ref 0–0.2)
BASOPHILS NFR BLD AUTO: 0.8 % (ref 0–2)
BUN SERPL-MCNC: 21 MG/DL (ref 7–17)
CALCIUM SERPL-MCNC: 9.6 MG/DL (ref 8.4–10.2)
CHLORIDE SERPL-SCNC: 104 MMOL/L (ref 98–107)
CO2 SERPL-SCNC: 26 MMOL/L (ref 22–30)
COLOR CSF: COLORLESS
CREAT SERPL-SCNC: 1.06 UMOL/L (ref 0.52–1.25)
EOSINOPHIL # BLD: 0.1 10*3/UL (ref 0–0.7)
EOSINOPHIL NFR BLD: 0.8 % (ref 0–4)
ERYTHROCYTE [DISTWIDTH] IN BLOOD BY AUTOMATED COUNT: 14.4 % (ref 11.5–14.5)
GLUCOSE CSF-MCNC: 54 MG/DL (ref 40–70)
GLUCOSE SERPL-MCNC: 84 MG/DL (ref 74–106)
GRANULOCYTES # BLD AUTO: 62.7 % (ref 42.2–75.2)
GRANULOCYTES NFR CSF MANUAL: 0 % (ref 0–6)
HCT VFR BLD AUTO: 39.6 % (ref 37–47)
HGB BLD-MCNC: 12.3 G/DL (ref 12.5–16)
LYMPHOCYTES # BLD: 2.4 10*3/UL (ref 1.2–3.4)
LYMPHOCYTES NFR BLD: 26.2 % (ref 20–51)
MAGNESIUM SERPL-MCNC: 2.1 MG/DL (ref 1.6–2.3)
MCH RBC QN AUTO: 28 PG (ref 27–31)
MCHC RBC AUTO-ENTMCNC: 31 G/DL (ref 33–37)
MCV RBC AUTO: 88 FL (ref 80–100)
MONOCYTES # BLD: 0.8 10*3/UL (ref 0.1–0.6)
MONOCYTES NFR BLD AUTO: 9.3 % (ref 1.7–9.3)
MONOCYTES NFR CSF MANUAL: 100 % (ref 70–100)
NEUTROPHILS # BLD: 5.7 10*3/UL (ref 1.4–6.5)
PLATELET # BLD AUTO: 267 K/MM3 (ref 130–400)
PMV BLD AUTO: 10.5 FL (ref 7.4–10.4)
POTASSIUM SERPL-SCNC: 3 MMOL/L (ref 3.4–5)
PROT CSF-MCNC: 35 MG/DL (ref 15–45)
RBC # BLD AUTO: 4.48 M/MM3 (ref 4.1–5.3)
RBC # CSF: < 1 /MM3 (ref 0–0)
SODIUM SERPL-SCNC: 140 MMOL/L (ref 137–145)

## 2021-02-16 NOTE — NUR
Pt resting in bed with eyes closed, snoring intermittently, awakens briefly to
stimuli but not awake enough at this time to safely swallow medication. Will
attempt to administer medication again soon.

## 2021-02-16 NOTE — NUR
Cathflo instilled in red lumen of PICC line per orders/protocol. Telemetry
monitor obtained, will be placed when Echo complete.

## 2021-02-16 NOTE — NUR
met with the patient to complete intake. The patient lives in
[place] with her , Bill. The patient denies DME use and is independent
with ADLs. The patient's PCP is Dr. Cantu and amy receives medications
from [facility] pharmacy. The patient does not have advanced directives in the EMR
but states they are complete and designate Bill. The patient plans to return
home at discharge and has no concerns about doing so. Bill will provide
transporation. There are no additional needs at this time.

## 2021-02-16 NOTE — NUR
Report with CHIDI Fitzgerald. Pt resting in bed with eyes closed, resp even and
unlabored. Call light in reach.

## 2021-02-16 NOTE — NUR
Assessment complete. Patient is much more alert, less drowsy, than last night.
She has no complaints of pain but does request zofran for nausea, which is
adminstered at this time.  is currently at bedside. Her left hand to
forearm petichael rash from last night has improved and still does not cause
any discomfort. No new concerns; Call light in reach.

## 2021-02-16 NOTE — NUR
Follow-up visit; Patient thanked  for looking in on her again today.
 pleased to find Jorge doing much better. She is rested and not in
pain.  offered God's blessings and will continue to keep her in
's prayers.

## 2021-02-16 NOTE — NUR
Attempted to get blood return from red port of PICC line, slight resistance
with flush and no blood return. Kimberly RN AIV aware. Pt reports feeling
better and requesting an advanced diet from clear liquids. Provider notified.

## 2021-02-16 NOTE — NUR
attended clinical rounds with the team. The patient's 
and daughter were on speaker phone. Hospitalist answered all questions.

## 2021-02-16 NOTE — NUR
Assessment complete. Pt recently returned to room following LP, awake and
alert at this time, denies pain or nausea. Pt aware of activity restrictions,
verbalizes understanding to lay flat for one hour. Bed placed in reverse
trendelenburg to allow pt to drink water. Physical assessment unremarkable.
PICC line to right upper arm without s/s of infiltration or phlebitis, blood
pulled from purple port for sched labs, no blood return from red port. No
further needs reported. Call light in reach.

## 2021-02-17 VITALS — SYSTOLIC BLOOD PRESSURE: 107 MMHG | TEMPERATURE: 98.3 F | DIASTOLIC BLOOD PRESSURE: 64 MMHG | HEART RATE: 62 BPM

## 2021-02-17 VITALS — DIASTOLIC BLOOD PRESSURE: 67 MMHG | HEART RATE: 62 BPM | TEMPERATURE: 98.3 F | SYSTOLIC BLOOD PRESSURE: 114 MMHG

## 2021-02-17 VITALS — HEART RATE: 74 BPM | DIASTOLIC BLOOD PRESSURE: 59 MMHG | TEMPERATURE: 98.5 F | SYSTOLIC BLOOD PRESSURE: 94 MMHG

## 2021-02-17 LAB
ALBUMIN SERPL-MCNC: 4 GM/DL (ref 3.5–5)
ALP SERPL-CCNC: 76 U/L (ref 50–136)
ALT SERPL-CCNC: 19 U/L (ref 4–34)
ANION GAP SERPL CALC-SCNC: 8 MMOL/L (ref 7–16)
AST SERPL-CCNC: 30 U/L (ref 15–37)
BASOPHILS # BLD: 0.1 10*3/UL (ref 0–0.2)
BASOPHILS NFR BLD AUTO: 1 % (ref 0–2)
BILIRUB SERPL-MCNC: 0.4 MG/DL (ref 0–1)
BUN SERPL-MCNC: 23 MG/DL (ref 7–17)
CALCIUM SERPL-MCNC: 9.3 MG/DL (ref 8.4–10.2)
CHLORIDE SERPL-SCNC: 104 MMOL/L (ref 98–107)
CO2 SERPL-SCNC: 28 MMOL/L (ref 22–30)
CREAT SERPL-SCNC: 1.13 UMOL/L (ref 0.52–1.25)
EOSINOPHIL # BLD: 0.3 10*3/UL (ref 0–0.7)
EOSINOPHIL NFR BLD: 3.2 % (ref 0–4)
ERYTHROCYTE [DISTWIDTH] IN BLOOD BY AUTOMATED COUNT: 14.5 % (ref 11.5–14.5)
GLUCOSE SERPL-MCNC: 84 MG/DL (ref 74–106)
GRANULOCYTES # BLD AUTO: 50.9 % (ref 42.2–75.2)
HCT VFR BLD AUTO: 40.8 % (ref 37–47)
HGB BLD-MCNC: 12.7 G/DL (ref 12.5–16)
LYMPHOCYTES # BLD: 2.9 10*3/UL (ref 1.2–3.4)
LYMPHOCYTES NFR BLD: 35 % (ref 20–51)
MAGNESIUM SERPL-MCNC: 2.1 MG/DL (ref 1.6–2.3)
MCH RBC QN AUTO: 28 PG (ref 27–31)
MCHC RBC AUTO-ENTMCNC: 31 G/DL (ref 33–37)
MCV RBC AUTO: 89 FL (ref 80–100)
MONOCYTES # BLD: 0.8 10*3/UL (ref 0.1–0.6)
MONOCYTES NFR BLD AUTO: 9.5 % (ref 1.7–9.3)
NEUTROPHILS # BLD: 4.2 10*3/UL (ref 1.4–6.5)
PLATELET # BLD AUTO: 291 K/MM3 (ref 130–400)
PMV BLD AUTO: 11.1 FL (ref 7.4–10.4)
POTASSIUM SERPL-SCNC: 3.6 MMOL/L (ref 3.4–5)
PROT SERPL-MCNC: 7.3 GM/DL (ref 6.4–8.2)
RBC # BLD AUTO: 4.58 M/MM3 (ref 4.1–5.3)
SODIUM SERPL-SCNC: 140 MMOL/L (ref 137–145)

## 2021-02-17 NOTE — NUR
Pt resting in bed. Assessment completed. Telemetry on-HR regular. Pt reports
"bladder pain" 6/10, primary nurse notified. Rt arm picc line clean and
intact, wrapped w/ ace bandage. Bed rails padded. Call light in reach.

## 2021-02-17 NOTE — NUR
Discharge instructions discussed with pt and all questions answered. Right
upper PICC still in place and w/o S/S complication. Pt awaiting ride from
.

## 2021-02-17 NOTE — NUR
Pt taken down to ED entrance via wheelchair by this RN and accompanied by
. All belongings in possession.

## 2021-02-18 ENCOUNTER — HOSPITAL ENCOUNTER (EMERGENCY)
Dept: HOSPITAL 19 - COL.ER | Age: 61
Discharge: HOME | End: 2021-02-18
Attending: EMERGENCY MEDICINE
Payer: COMMERCIAL

## 2021-02-18 VITALS — TEMPERATURE: 98.9 F

## 2021-02-18 VITALS — SYSTOLIC BLOOD PRESSURE: 169 MMHG | HEART RATE: 98 BPM | DIASTOLIC BLOOD PRESSURE: 98 MMHG

## 2021-02-18 VITALS — WEIGHT: 160.28 LBS | BODY MASS INDEX: 31.47 KG/M2 | HEIGHT: 60 IN

## 2021-02-18 DIAGNOSIS — Z88.6: ICD-10-CM

## 2021-02-18 DIAGNOSIS — Z85.038: ICD-10-CM

## 2021-02-18 DIAGNOSIS — F32.9: ICD-10-CM

## 2021-02-18 DIAGNOSIS — E03.9: ICD-10-CM

## 2021-02-18 DIAGNOSIS — Z90.710: ICD-10-CM

## 2021-02-18 DIAGNOSIS — R11.2: ICD-10-CM

## 2021-02-18 DIAGNOSIS — Z88.1: ICD-10-CM

## 2021-02-18 DIAGNOSIS — Z79.890: ICD-10-CM

## 2021-02-18 DIAGNOSIS — R10.9: Primary | ICD-10-CM

## 2021-02-18 DIAGNOSIS — R56.9: ICD-10-CM

## 2021-02-18 LAB
ALBUMIN SERPL-MCNC: 4.4 GM/DL (ref 3.5–5)
ALP SERPL-CCNC: 109 U/L (ref 50–136)
ALT SERPL-CCNC: 23 U/L (ref 4–34)
ANION GAP SERPL CALC-SCNC: 11 MMOL/L (ref 7–16)
AST SERPL-CCNC: 29 U/L (ref 15–37)
BASOPHILS # BLD: 0 10*3/UL (ref 0–0.2)
BASOPHILS NFR BLD AUTO: 0.4 % (ref 0–2)
BILIRUB SERPL-MCNC: 0.4 MG/DL (ref 0–1)
BUN SERPL-MCNC: 14 MG/DL (ref 7–17)
CALCIUM SERPL-MCNC: 9.8 MG/DL (ref 8.4–10.2)
CHLORIDE SERPL-SCNC: 100 MMOL/L (ref 98–107)
CO2 SERPL-SCNC: 26 MMOL/L (ref 22–30)
CREAT SERPL-SCNC: 0.84 UMOL/L (ref 0.52–1.25)
CRP SERPL-MCNC: 0.6 MG/DL (ref 0–0.9)
EOSINOPHIL # BLD: 0 10*3/UL (ref 0–0.7)
EOSINOPHIL NFR BLD: 0.3 % (ref 0–4)
ERYTHROCYTE [DISTWIDTH] IN BLOOD BY AUTOMATED COUNT: 14.1 % (ref 11.5–14.5)
GLUCOSE SERPL-MCNC: 153 MG/DL (ref 74–106)
GRANULOCYTES # BLD AUTO: 84.5 % (ref 42.2–75.2)
HCT VFR BLD AUTO: 45.1 % (ref 37–47)
HGB BLD-MCNC: 14.2 G/DL (ref 12.5–16)
LIPASE SERPL-CCNC: 100 U/L (ref 23–300)
LYMPHOCYTES # BLD: 0.8 10*3/UL (ref 1.2–3.4)
LYMPHOCYTES NFR BLD: 10.8 % (ref 20–51)
MAGNESIUM SERPL-MCNC: 2 MG/DL (ref 1.6–2.3)
MCH RBC QN AUTO: 27 PG (ref 27–31)
MCHC RBC AUTO-ENTMCNC: 32 G/DL (ref 33–37)
MCV RBC AUTO: 87 FL (ref 80–100)
MONOCYTES # BLD: 0.3 10*3/UL (ref 0.1–0.6)
MONOCYTES NFR BLD AUTO: 3.7 % (ref 1.7–9.3)
NEUTROPHILS # BLD: 6.5 10*3/UL (ref 1.4–6.5)
PLATELET # BLD AUTO: 297 K/MM3 (ref 130–400)
PMV BLD AUTO: 10.8 FL (ref 7.4–10.4)
POTASSIUM SERPL-SCNC: 3.8 MMOL/L (ref 3.4–5)
PROT SERPL-MCNC: 8.6 GM/DL (ref 6.4–8.2)
RBC # BLD AUTO: 5.19 M/MM3 (ref 4.1–5.3)
SODIUM SERPL-SCNC: 137 MMOL/L (ref 137–145)

## 2021-03-05 ENCOUNTER — HOSPITAL ENCOUNTER (OUTPATIENT)
Dept: HOSPITAL 19 - EUO | Age: 61
Discharge: HOME | End: 2021-03-05
Attending: INTERNAL MEDICINE
Payer: COMMERCIAL

## 2021-03-05 VITALS — SYSTOLIC BLOOD PRESSURE: 125 MMHG | HEART RATE: 74 BPM | DIASTOLIC BLOOD PRESSURE: 76 MMHG

## 2021-03-05 DIAGNOSIS — Z45.2: Primary | ICD-10-CM

## 2021-03-05 NOTE — NUR
Received a phone call from patient regarding not able to obtain a blood return
from PICC. Patient advised to contact physician's office. order obtained to
come to . patient here in  with PICC intact right upper arm. Patient
reports unable to obtain blood return since last "pain attack." Both ports
flushed with 50 ml normal saline with patient coughing and position change.
able to obtain a blood return from purple port. unable to obtain a blood
return from red port. both ports flushed easily with no resistance.  nurse
will instill cath-estevan. patient advised.

## 2021-03-05 NOTE — NUR
ESPERANZA Harris reported she flushed [facility] PICC lines and reported red line not
returning blood.Cathflow ordered.Upon return,I flushed and checked for blood
return,blood return observed from both lines.Reported to [facility].No
cathflow given.Pt escorted out via wheelchair by this nurse.

## 2021-03-06 ENCOUNTER — HOSPITAL ENCOUNTER (OUTPATIENT)
Dept: HOSPITAL 6 - LAB | Age: 61
End: 2021-03-06
Payer: COMMERCIAL

## 2021-03-06 DIAGNOSIS — Z01.89: Primary | ICD-10-CM

## 2021-03-06 LAB
APPEARANCE UR: CLEAR
BILIRUB UR QL STRIP.AUTO: NEGATIVE
COLOR UR AUTO: YELLOW
GLUCOSE UR QL STRIP.AUTO: NEGATIVE MG/DL
KETONES UR QL STRIP.AUTO: NEGATIVE
LEUKOCYTE ESTERASE UR QL STRIP: (no result)
NITRITE UR QL STRIP: NEGATIVE
PH UR STRIP.AUTO: 5.5 [PH] (ref 5–8)
PROT ?TM UR-MCNC: NEGATIVE MG/DL
RBC UR QL AUTO: NEGATIVE
SP GR UR STRIP.AUTO: 1.01 (ref 1–1.03)
UROBILINOGEN UR-MCNC: NORMAL MG/DL
WBC #/AREA URNS HPF: (no result) /HPF (ref 0–3)

## 2021-03-12 ENCOUNTER — HOSPITAL ENCOUNTER (EMERGENCY)
Dept: HOSPITAL 19 - COL.ER | Age: 61
Discharge: HOME | End: 2021-03-12
Attending: EMERGENCY MEDICINE
Payer: COMMERCIAL

## 2021-03-12 VITALS — HEIGHT: 67.99 IN | BODY MASS INDEX: 24.29 KG/M2 | WEIGHT: 160.28 LBS

## 2021-03-12 VITALS — HEART RATE: 89 BPM | SYSTOLIC BLOOD PRESSURE: 124 MMHG | DIASTOLIC BLOOD PRESSURE: 64 MMHG

## 2021-03-12 VITALS — TEMPERATURE: 98.1 F

## 2021-03-12 DIAGNOSIS — Z90.710: ICD-10-CM

## 2021-03-12 DIAGNOSIS — Z85.038: ICD-10-CM

## 2021-03-12 DIAGNOSIS — R10.9: Primary | ICD-10-CM

## 2021-03-12 DIAGNOSIS — R11.2: ICD-10-CM

## 2021-03-12 DIAGNOSIS — Z88.6: ICD-10-CM

## 2021-03-12 DIAGNOSIS — Z88.1: ICD-10-CM

## 2021-03-12 LAB
ALBUMIN SERPL-MCNC: 4.4 GM/DL (ref 3.5–5)
ALP SERPL-CCNC: 97 U/L (ref 50–136)
ALT SERPL-CCNC: 14 U/L (ref 4–34)
ANION GAP SERPL CALC-SCNC: 9 MMOL/L (ref 7–16)
AST SERPL-CCNC: 28 U/L (ref 15–37)
BASOPHILS # BLD: 0.1 10*3/UL (ref 0–0.2)
BASOPHILS NFR BLD AUTO: 0.8 % (ref 0–2)
BILIRUB SERPL-MCNC: 0.4 MG/DL (ref 0–1)
BUN SERPL-MCNC: 16 MG/DL (ref 7–17)
CALCIUM SERPL-MCNC: 9.5 MG/DL (ref 8.4–10.2)
CHLORIDE SERPL-SCNC: 104 MMOL/L (ref 98–107)
CO2 SERPL-SCNC: 24 MMOL/L (ref 22–30)
CREAT SERPL-SCNC: 1.19 UMOL/L (ref 0.52–1.25)
EOSINOPHIL # BLD: 0.7 10*3/UL (ref 0–0.7)
EOSINOPHIL NFR BLD: 9.9 % (ref 0–4)
ERYTHROCYTE [DISTWIDTH] IN BLOOD BY AUTOMATED COUNT: 14.6 % (ref 11.5–14.5)
GLUCOSE SERPL-MCNC: 119 MG/DL (ref 74–106)
GRANULOCYTES # BLD AUTO: 71.6 % (ref 42.2–75.2)
HCT VFR BLD AUTO: 40 % (ref 37–47)
HGB BLD-MCNC: 13 G/DL (ref 12.5–16)
LYMPHOCYTES # BLD: 0.8 10*3/UL (ref 1.2–3.4)
LYMPHOCYTES NFR BLD: 11.4 % (ref 20–51)
MCH RBC QN AUTO: 27 PG (ref 27–31)
MCHC RBC AUTO-ENTMCNC: 33 G/DL (ref 33–37)
MCV RBC AUTO: 84 FL (ref 80–100)
MONOCYTES # BLD: 0.4 10*3/UL (ref 0.1–0.6)
MONOCYTES NFR BLD AUTO: 6.1 % (ref 1.7–9.3)
NEUTROPHILS # BLD: 4.7 10*3/UL (ref 1.4–6.5)
PLATELET # BLD AUTO: 176 K/MM3 (ref 130–400)
PMV BLD AUTO: 11 FL (ref 7.4–10.4)
POTASSIUM SERPL-SCNC: 3.8 MMOL/L (ref 3.4–5)
PROT SERPL-MCNC: 8.8 GM/DL (ref 6.4–8.2)
RBC # BLD AUTO: 4.78 M/MM3 (ref 4.1–5.3)
SODIUM SERPL-SCNC: 137 MMOL/L (ref 137–145)

## 2021-03-12 NOTE — NUR
Received a call from ED nurse and unable to flush PICC and obtain a blood
return. chest x ray done and reviewed. Patient reported no problems with PICC
at home. Both ports flushed with 10 ml normal saline without difficulty.
minimal blood return noted. insertion site with no chlorhexidine disk.
sterile PICC dressing change done with insertion site cleansed with
chloraprep x 1, chlorhexidine impregnated disk applied, skin prep, stat lock,
and tegaderm applied. medication infused. both caps change and flush each
lumen with 10ml normal saline with good blood return noted.

## 2021-03-18 ENCOUNTER — HOSPITAL ENCOUNTER (OUTPATIENT)
Dept: HOSPITAL 19 - COL.RAD | Age: 61
End: 2021-03-18
Attending: UROLOGY
Payer: COMMERCIAL

## 2021-03-18 VITALS — DIASTOLIC BLOOD PRESSURE: 92 MMHG | SYSTOLIC BLOOD PRESSURE: 139 MMHG | HEART RATE: 74 BPM

## 2021-03-18 VITALS — SYSTOLIC BLOOD PRESSURE: 154 MMHG | DIASTOLIC BLOOD PRESSURE: 107 MMHG | HEART RATE: 89 BPM

## 2021-03-18 VITALS — HEART RATE: 69 BPM | DIASTOLIC BLOOD PRESSURE: 108 MMHG | SYSTOLIC BLOOD PRESSURE: 175 MMHG

## 2021-03-18 VITALS — SYSTOLIC BLOOD PRESSURE: 178 MMHG | HEART RATE: 72 BPM | DIASTOLIC BLOOD PRESSURE: 109 MMHG

## 2021-03-18 VITALS — DIASTOLIC BLOOD PRESSURE: 111 MMHG | HEART RATE: 76 BPM | SYSTOLIC BLOOD PRESSURE: 162 MMHG

## 2021-03-18 VITALS — SYSTOLIC BLOOD PRESSURE: 176 MMHG | HEART RATE: 83 BPM | DIASTOLIC BLOOD PRESSURE: 115 MMHG

## 2021-03-18 VITALS — DIASTOLIC BLOOD PRESSURE: 101 MMHG | HEART RATE: 75 BPM | SYSTOLIC BLOOD PRESSURE: 165 MMHG

## 2021-03-18 VITALS — HEART RATE: 75 BPM | DIASTOLIC BLOOD PRESSURE: 104 MMHG | SYSTOLIC BLOOD PRESSURE: 184 MMHG

## 2021-03-18 VITALS — HEART RATE: 73 BPM | DIASTOLIC BLOOD PRESSURE: 101 MMHG | SYSTOLIC BLOOD PRESSURE: 166 MMHG

## 2021-03-18 VITALS — DIASTOLIC BLOOD PRESSURE: 113 MMHG | SYSTOLIC BLOOD PRESSURE: 174 MMHG | HEART RATE: 86 BPM

## 2021-03-18 VITALS — DIASTOLIC BLOOD PRESSURE: 104 MMHG | HEART RATE: 81 BPM | SYSTOLIC BLOOD PRESSURE: 179 MMHG

## 2021-03-18 VITALS — SYSTOLIC BLOOD PRESSURE: 163 MMHG | DIASTOLIC BLOOD PRESSURE: 95 MMHG | HEART RATE: 79 BPM

## 2021-03-18 VITALS — HEART RATE: 70 BPM | DIASTOLIC BLOOD PRESSURE: 105 MMHG | SYSTOLIC BLOOD PRESSURE: 189 MMHG

## 2021-03-18 VITALS — SYSTOLIC BLOOD PRESSURE: 176 MMHG | HEART RATE: 83 BPM | DIASTOLIC BLOOD PRESSURE: 107 MMHG

## 2021-03-18 VITALS — SYSTOLIC BLOOD PRESSURE: 176 MMHG | DIASTOLIC BLOOD PRESSURE: 101 MMHG | HEART RATE: 86 BPM

## 2021-03-18 VITALS — DIASTOLIC BLOOD PRESSURE: 105 MMHG | HEART RATE: 80 BPM | SYSTOLIC BLOOD PRESSURE: 177 MMHG

## 2021-03-18 VITALS — SYSTOLIC BLOOD PRESSURE: 165 MMHG | DIASTOLIC BLOOD PRESSURE: 103 MMHG | HEART RATE: 85 BPM

## 2021-03-18 VITALS — DIASTOLIC BLOOD PRESSURE: 113 MMHG | HEART RATE: 74 BPM | SYSTOLIC BLOOD PRESSURE: 156 MMHG

## 2021-03-18 VITALS — HEART RATE: 86 BPM | SYSTOLIC BLOOD PRESSURE: 162 MMHG | DIASTOLIC BLOOD PRESSURE: 111 MMHG

## 2021-03-18 VITALS — HEIGHT: 60 IN | WEIGHT: 155.65 LBS | BODY MASS INDEX: 30.56 KG/M2

## 2021-03-18 DIAGNOSIS — N13.1: Primary | ICD-10-CM

## 2021-03-18 DIAGNOSIS — Z93.6: ICD-10-CM

## 2021-03-18 NOTE — NUR
CALLED AND LEFT A VOICE MAIL THAT PT WAS DOING FINE. BP WAS HIGH AND SHE WOULD
BE READY TO LEAVE AT 1130.
 
[id number] CALLED AND REPORTED HE IS IN LOBBY AND READY TO TAKE HIS WIFE
HOME. PT TAKEN TO LOBBY IN WHEELCHAIR BY STAFF.

## 2021-04-08 ENCOUNTER — HOSPITAL ENCOUNTER (EMERGENCY)
Dept: HOSPITAL 19 - COL.ER | Age: 61
LOS: 1 days | Discharge: HOME | End: 2021-04-09
Payer: COMMERCIAL

## 2021-04-08 VITALS — WEIGHT: 160.28 LBS | BODY MASS INDEX: 23.74 KG/M2 | HEIGHT: 69.02 IN

## 2021-04-08 VITALS — TEMPERATURE: 97.4 F

## 2021-04-08 DIAGNOSIS — R11.2: ICD-10-CM

## 2021-04-08 DIAGNOSIS — F32.9: ICD-10-CM

## 2021-04-08 DIAGNOSIS — Z88.6: ICD-10-CM

## 2021-04-08 DIAGNOSIS — Z88.8: ICD-10-CM

## 2021-04-08 DIAGNOSIS — G40.909: ICD-10-CM

## 2021-04-08 DIAGNOSIS — Z85.038: ICD-10-CM

## 2021-04-08 DIAGNOSIS — Z96.0: ICD-10-CM

## 2021-04-08 DIAGNOSIS — R10.9: Primary | ICD-10-CM

## 2021-04-08 LAB
ALBUMIN SERPL-MCNC: 4.5 GM/DL (ref 3.5–5)
ALP SERPL-CCNC: 100 U/L (ref 50–136)
ALT SERPL-CCNC: 15 U/L (ref 4–34)
ANION GAP SERPL CALC-SCNC: 13 MMOL/L (ref 7–16)
AST SERPL-CCNC: 22 U/L (ref 15–37)
BASOPHILS # BLD: 0 10*3/UL (ref 0–0.2)
BASOPHILS NFR BLD AUTO: 0.4 % (ref 0–2)
BILIRUB SERPL-MCNC: 0.3 MG/DL (ref 0–1)
BUN SERPL-MCNC: 10 MG/DL (ref 7–17)
CALCIUM SERPL-MCNC: 9.6 MG/DL (ref 8.4–10.2)
CHLORIDE SERPL-SCNC: 105 MMOL/L (ref 98–107)
CO2 SERPL-SCNC: 20 MMOL/L (ref 22–30)
CREAT SERPL-SCNC: 0.73 UMOL/L (ref 0.52–1.25)
CRP SERPL-MCNC: 1.5 MG/DL (ref 0–0.9)
EOSINOPHIL # BLD: 0.1 10*3/UL (ref 0–0.7)
EOSINOPHIL NFR BLD: 1.8 % (ref 0–4)
ERYTHROCYTE [DISTWIDTH] IN BLOOD BY AUTOMATED COUNT: 15.4 % (ref 11.5–14.5)
GLUCOSE SERPL-MCNC: 170 MG/DL (ref 74–106)
GLUCOSE UR QL STRIP.AUTO: (no result)
GRANULOCYTES # BLD AUTO: 77.1 % (ref 42.2–75.2)
HCT VFR BLD AUTO: 40.2 % (ref 37–47)
HGB BLD-MCNC: 13.2 G/DL (ref 12.5–16)
KETONES UR STRIP.AUTO-MCNC: (no result) MG/DL
LIPASE SERPL-CCNC: 151 U/L (ref 23–300)
LYMPHOCYTES # BLD: 1.2 10*3/UL (ref 1.2–3.4)
LYMPHOCYTES NFR BLD: 15.7 % (ref 20–51)
MAGNESIUM SERPL-MCNC: 2.1 MG/DL (ref 1.6–2.3)
MCH RBC QN AUTO: 27 PG (ref 27–31)
MCHC RBC AUTO-ENTMCNC: 33 G/DL (ref 33–37)
MCV RBC AUTO: 82 FL (ref 80–100)
MONOCYTES # BLD: 0.4 10*3/UL (ref 0.1–0.6)
MONOCYTES NFR BLD AUTO: 4.9 % (ref 1.7–9.3)
NEUTROPHILS # BLD: 5.6 10*3/UL (ref 1.4–6.5)
PH UR STRIP.AUTO: 7 [PH] (ref 5–8)
PLATELET # BLD AUTO: 332 K/MM3 (ref 130–400)
PMV BLD AUTO: 10.1 FL (ref 7.4–10.4)
POTASSIUM SERPL-SCNC: 3.3 MMOL/L (ref 3.4–5)
PROT SERPL-MCNC: 9.4 GM/DL (ref 6.4–8.2)
RBC # BLD AUTO: 4.89 M/MM3 (ref 4.1–5.3)
RBC # UR: (no result) /HPF
SODIUM SERPL-SCNC: 138 MMOL/L (ref 137–145)
SP GR UR STRIP.AUTO: 1.01 (ref 1–1.03)
URN COLLECT METHOD CLASS: (no result)

## 2021-04-09 VITALS — SYSTOLIC BLOOD PRESSURE: 163 MMHG | DIASTOLIC BLOOD PRESSURE: 100 MMHG | HEART RATE: 80 BPM

## 2021-05-07 ENCOUNTER — HOSPITAL ENCOUNTER (OUTPATIENT)
Dept: HOSPITAL 19 - EUO | Age: 61
Discharge: HOME | End: 2021-05-07
Payer: COMMERCIAL

## 2021-05-07 VITALS — TEMPERATURE: 97.4 F | SYSTOLIC BLOOD PRESSURE: 98 MMHG | HEART RATE: 58 BPM | DIASTOLIC BLOOD PRESSURE: 52 MMHG

## 2021-05-07 VITALS — BODY MASS INDEX: 16.54 KG/M2 | WEIGHT: 109.13 LBS | HEIGHT: 67.99 IN

## 2021-05-07 DIAGNOSIS — E86.0: ICD-10-CM

## 2021-05-07 DIAGNOSIS — C19: ICD-10-CM

## 2021-05-07 DIAGNOSIS — C78.7: Primary | ICD-10-CM

## 2021-05-07 DIAGNOSIS — C78.6: ICD-10-CM

## 2021-05-07 DIAGNOSIS — C77.2: ICD-10-CM

## 2021-05-10 ENCOUNTER — HOSPITAL ENCOUNTER (OUTPATIENT)
Dept: HOSPITAL 19 - COL.RAD | Age: 61
Discharge: HOME | End: 2021-05-10
Payer: COMMERCIAL

## 2021-05-10 VITALS — TEMPERATURE: 97.9 F | DIASTOLIC BLOOD PRESSURE: 82 MMHG | SYSTOLIC BLOOD PRESSURE: 112 MMHG | HEART RATE: 61 BPM

## 2021-05-10 DIAGNOSIS — C19: Primary | ICD-10-CM

## 2021-05-10 DIAGNOSIS — E86.0: ICD-10-CM

## 2021-05-13 ENCOUNTER — HOSPITAL ENCOUNTER (OUTPATIENT)
Dept: HOSPITAL 19 - COL.RAD | Age: 61
Discharge: HOME | End: 2021-05-13
Attending: UROLOGY
Payer: COMMERCIAL

## 2021-05-13 VITALS — SYSTOLIC BLOOD PRESSURE: 120 MMHG | HEART RATE: 71 BPM | DIASTOLIC BLOOD PRESSURE: 74 MMHG

## 2021-05-13 DIAGNOSIS — N13.1: ICD-10-CM

## 2021-05-13 DIAGNOSIS — Z45.2: Primary | ICD-10-CM

## 2021-05-13 DIAGNOSIS — C78.6: ICD-10-CM

## 2021-05-13 DIAGNOSIS — C19: ICD-10-CM

## 2021-05-13 DIAGNOSIS — C77.2: ICD-10-CM

## 2021-05-13 DIAGNOSIS — C78.7: ICD-10-CM

## 2021-05-13 PROCEDURE — C1751 CATH, INF, PER/CENT/MIDLINE: HCPCS

## 2021-05-13 NOTE — NUR
Pt opted to come back tomorrow morning for radiology procedure as Dr. Stiles
was not available today to complete procedure. Pt instructed to remain NPO
after midnight, but morning meds are OK with small sip of water. She expressed
understanding. IV tubing had become kinked while in EU, approximatley 500ml
infused prior to discharging. She states she has to leave prior to completion
of liter because she has radiation therapy appt to get to. She is assisted out
to son's car by wheelchair.

## 2021-05-13 NOTE — NUR
Purple and red lumens flushed with 20 ml of NS with minimal resistance, and
then give sluggish blood return. JOSÉ MIGUEL Harris RN notified, and recommends CXR.

## 2021-05-14 ENCOUNTER — HOSPITAL ENCOUNTER (OUTPATIENT)
Dept: HOSPITAL 19 - COL.RAD | Age: 61
End: 2021-05-14
Attending: UROLOGY
Payer: COMMERCIAL

## 2021-05-14 VITALS — DIASTOLIC BLOOD PRESSURE: 79 MMHG | SYSTOLIC BLOOD PRESSURE: 119 MMHG | HEART RATE: 88 BPM

## 2021-05-14 VITALS — HEART RATE: 93 BPM | DIASTOLIC BLOOD PRESSURE: 84 MMHG | SYSTOLIC BLOOD PRESSURE: 123 MMHG

## 2021-05-14 VITALS — BODY MASS INDEX: 22.39 KG/M2 | WEIGHT: 147.71 LBS | HEIGHT: 67.99 IN

## 2021-05-14 VITALS — DIASTOLIC BLOOD PRESSURE: 71 MMHG | SYSTOLIC BLOOD PRESSURE: 112 MMHG | HEART RATE: 81 BPM

## 2021-05-14 VITALS — DIASTOLIC BLOOD PRESSURE: 83 MMHG | SYSTOLIC BLOOD PRESSURE: 132 MMHG | HEART RATE: 91 BPM

## 2021-05-14 VITALS — HEART RATE: 96 BPM | DIASTOLIC BLOOD PRESSURE: 80 MMHG | SYSTOLIC BLOOD PRESSURE: 132 MMHG

## 2021-05-14 VITALS — SYSTOLIC BLOOD PRESSURE: 127 MMHG | HEART RATE: 92 BPM | DIASTOLIC BLOOD PRESSURE: 93 MMHG

## 2021-05-14 VITALS — DIASTOLIC BLOOD PRESSURE: 87 MMHG | HEART RATE: 92 BPM | SYSTOLIC BLOOD PRESSURE: 137 MMHG

## 2021-05-14 VITALS — HEART RATE: 93 BPM | SYSTOLIC BLOOD PRESSURE: 135 MMHG | DIASTOLIC BLOOD PRESSURE: 77 MMHG

## 2021-05-14 VITALS — HEART RATE: 93 BPM | DIASTOLIC BLOOD PRESSURE: 75 MMHG | SYSTOLIC BLOOD PRESSURE: 132 MMHG

## 2021-05-14 VITALS — SYSTOLIC BLOOD PRESSURE: 121 MMHG | HEART RATE: 92 BPM | DIASTOLIC BLOOD PRESSURE: 72 MMHG

## 2021-05-14 VITALS — SYSTOLIC BLOOD PRESSURE: 129 MMHG | HEART RATE: 96 BPM | DIASTOLIC BLOOD PRESSURE: 84 MMHG

## 2021-05-14 VITALS — SYSTOLIC BLOOD PRESSURE: 131 MMHG | DIASTOLIC BLOOD PRESSURE: 89 MMHG | HEART RATE: 93 BPM

## 2021-05-14 VITALS — HEART RATE: 89 BPM | SYSTOLIC BLOOD PRESSURE: 122 MMHG | DIASTOLIC BLOOD PRESSURE: 89 MMHG

## 2021-05-14 VITALS — DIASTOLIC BLOOD PRESSURE: 71 MMHG | HEART RATE: 80 BPM | SYSTOLIC BLOOD PRESSURE: 121 MMHG

## 2021-05-14 VITALS — SYSTOLIC BLOOD PRESSURE: 120 MMHG | HEART RATE: 93 BPM | DIASTOLIC BLOOD PRESSURE: 81 MMHG

## 2021-05-14 VITALS — DIASTOLIC BLOOD PRESSURE: 81 MMHG | SYSTOLIC BLOOD PRESSURE: 143 MMHG | HEART RATE: 93 BPM

## 2021-05-14 DIAGNOSIS — Z96.0: ICD-10-CM

## 2021-05-14 DIAGNOSIS — N13.1: Primary | ICD-10-CM

## 2021-05-14 DIAGNOSIS — Z93.6: ICD-10-CM

## 2021-05-14 PROCEDURE — C1729 CATH, DRAINAGE: HCPCS

## 2021-05-14 PROCEDURE — 31844: CPT

## 2021-05-14 NOTE — NUR
Pt into ct and rolled onto table from [facility].  Monitors reapplied.  O2
continues at 2l/nc

## 2021-05-14 NOTE — NUR
Dr Stiles attempts to flush nephrostomy tube with contrast, constrast comes
back out the insertion site.

## 2021-05-14 NOTE — NUR
Dr Stiles unable to pass guidewire.  Pt to go to CT for replacement of
nephrostomy tube.  Ct notified of change and asked for time when possible.

## 2021-05-14 NOTE — NUR
Pt to procedure room for exchange of neph tube.  Pt placed on table in prone
position.  Monitors applied to pt.  Dr Stiles in room.

## 2021-05-27 ENCOUNTER — HOSPITAL ENCOUNTER (OUTPATIENT)
Dept: HOSPITAL 19 - EUO | Age: 61
Discharge: HOME | End: 2021-05-27
Attending: INTERNAL MEDICINE
Payer: COMMERCIAL

## 2021-05-27 VITALS — TEMPERATURE: 98 F | SYSTOLIC BLOOD PRESSURE: 97 MMHG | HEART RATE: 76 BPM | DIASTOLIC BLOOD PRESSURE: 64 MMHG

## 2021-05-27 VITALS — WEIGHT: 144.18 LBS | HEIGHT: 67.99 IN | BODY MASS INDEX: 21.85 KG/M2

## 2021-05-27 DIAGNOSIS — C18.7: Primary | ICD-10-CM

## 2021-05-27 NOTE — NUR
JOSÉ MIGUEL Harris RN was able to flush both lumens of PICC line, and changed caps.
She provided education to pt regarding correct caps for use on PICC line, as
well as issues microbore tubing may be causing with adequate flushing. This
information was reinforced by this nurse and pt expresses understanding. She
was assited out to son's car by wheelchair.

## 2021-05-27 NOTE — NUR
Here for a PICC evaluation. Patient reported home health nurse was unable to
flush PICC. dual lumen PICC intact with micro bore extension set present. blue
cap present. I was able to flush lumen with extension set. other lumen flushed
with 10 ml normal saline. extension set removed. cap changed and lumen flushed
with 10ml normal saline with good blood return noted. advised to add a macro
bore extension set if needed. to have  give IV medication without an
extension set. neutral cap advised. patient voiced understanding of
instructions. may contact me for any other comments, questions and/or
concerns. voiced understanding of instructions.

## 2021-06-11 ENCOUNTER — HOSPITAL ENCOUNTER (OUTPATIENT)
Dept: HOSPITAL 19 - COL.RAD | Age: 61
End: 2021-06-11
Attending: UROLOGY
Payer: COMMERCIAL

## 2021-06-11 VITALS — SYSTOLIC BLOOD PRESSURE: 115 MMHG | HEART RATE: 78 BPM | DIASTOLIC BLOOD PRESSURE: 59 MMHG

## 2021-06-11 VITALS — DIASTOLIC BLOOD PRESSURE: 77 MMHG | SYSTOLIC BLOOD PRESSURE: 124 MMHG | HEART RATE: 75 BPM

## 2021-06-11 VITALS — HEIGHT: 67.99 IN | WEIGHT: 144.62 LBS | BODY MASS INDEX: 21.92 KG/M2

## 2021-06-11 VITALS — SYSTOLIC BLOOD PRESSURE: 104 MMHG | HEART RATE: 74 BPM | DIASTOLIC BLOOD PRESSURE: 63 MMHG

## 2021-06-11 VITALS — SYSTOLIC BLOOD PRESSURE: 133 MMHG | DIASTOLIC BLOOD PRESSURE: 77 MMHG | HEART RATE: 82 BPM

## 2021-06-11 VITALS — HEART RATE: 76 BPM | DIASTOLIC BLOOD PRESSURE: 63 MMHG | SYSTOLIC BLOOD PRESSURE: 117 MMHG

## 2021-06-11 VITALS — SYSTOLIC BLOOD PRESSURE: 119 MMHG | HEART RATE: 73 BPM | DIASTOLIC BLOOD PRESSURE: 70 MMHG

## 2021-06-11 VITALS — SYSTOLIC BLOOD PRESSURE: 107 MMHG | HEART RATE: 76 BPM | DIASTOLIC BLOOD PRESSURE: 67 MMHG

## 2021-06-11 VITALS — SYSTOLIC BLOOD PRESSURE: 129 MMHG | HEART RATE: 81 BPM | DIASTOLIC BLOOD PRESSURE: 79 MMHG

## 2021-06-11 VITALS — HEART RATE: 74 BPM | SYSTOLIC BLOOD PRESSURE: 118 MMHG | DIASTOLIC BLOOD PRESSURE: 73 MMHG

## 2021-06-11 VITALS — HEART RATE: 70 BPM | SYSTOLIC BLOOD PRESSURE: 113 MMHG | DIASTOLIC BLOOD PRESSURE: 52 MMHG

## 2021-06-11 VITALS — SYSTOLIC BLOOD PRESSURE: 121 MMHG | HEART RATE: 77 BPM | DIASTOLIC BLOOD PRESSURE: 84 MMHG

## 2021-06-11 VITALS — DIASTOLIC BLOOD PRESSURE: 73 MMHG | SYSTOLIC BLOOD PRESSURE: 116 MMHG | HEART RATE: 80 BPM

## 2021-06-11 DIAGNOSIS — C78.7: ICD-10-CM

## 2021-06-11 DIAGNOSIS — C18.9: Primary | ICD-10-CM

## 2021-06-11 PROCEDURE — C1729 CATH, DRAINAGE: HCPCS

## 2021-07-13 NOTE — NUR
MEDICATED WITH PHENERGAN 12.5MG IV FOR PERSISTENT NAUSEA. VERY DROWSY. Patient BIBA to ED from [facility] with c/o mechanical trip and fall onto her face, no LOC, no blood thinner use.  Patient c/o pain to nose, forehead, and right side of face 2/10.  Patient being tested for early dementia at [facility], drover herself to the appointment states she has family who can  her home if needed today.

## 2021-07-15 ENCOUNTER — HOSPITAL ENCOUNTER (OUTPATIENT)
Dept: HOSPITAL 19 - COL.RAD | Age: 61
End: 2021-07-15
Attending: UROLOGY
Payer: COMMERCIAL

## 2021-07-15 VITALS — HEART RATE: 71 BPM | SYSTOLIC BLOOD PRESSURE: 119 MMHG | DIASTOLIC BLOOD PRESSURE: 75 MMHG

## 2021-07-15 VITALS — HEART RATE: 70 BPM | DIASTOLIC BLOOD PRESSURE: 96 MMHG | SYSTOLIC BLOOD PRESSURE: 145 MMHG

## 2021-07-15 VITALS — DIASTOLIC BLOOD PRESSURE: 98 MMHG | HEART RATE: 71 BPM | SYSTOLIC BLOOD PRESSURE: 147 MMHG

## 2021-07-15 VITALS — DIASTOLIC BLOOD PRESSURE: 94 MMHG | SYSTOLIC BLOOD PRESSURE: 136 MMHG | HEART RATE: 73 BPM

## 2021-07-15 VITALS — DIASTOLIC BLOOD PRESSURE: 108 MMHG | HEART RATE: 78 BPM | SYSTOLIC BLOOD PRESSURE: 148 MMHG

## 2021-07-15 VITALS — DIASTOLIC BLOOD PRESSURE: 100 MMHG | SYSTOLIC BLOOD PRESSURE: 153 MMHG | HEART RATE: 80 BPM

## 2021-07-15 VITALS — DIASTOLIC BLOOD PRESSURE: 83 MMHG | SYSTOLIC BLOOD PRESSURE: 127 MMHG | HEART RATE: 79 BPM

## 2021-07-15 VITALS — SYSTOLIC BLOOD PRESSURE: 161 MMHG | DIASTOLIC BLOOD PRESSURE: 108 MMHG | HEART RATE: 80 BPM

## 2021-07-15 VITALS — HEIGHT: 67.99 IN | BODY MASS INDEX: 21.72 KG/M2 | WEIGHT: 143.3 LBS

## 2021-07-15 VITALS — DIASTOLIC BLOOD PRESSURE: 90 MMHG | HEART RATE: 78 BPM | SYSTOLIC BLOOD PRESSURE: 144 MMHG

## 2021-07-15 DIAGNOSIS — N13.1: Primary | ICD-10-CM

## 2021-07-15 DIAGNOSIS — N13.39: ICD-10-CM

## 2021-07-15 PROCEDURE — 32174: CPT
